# Patient Record
Sex: FEMALE | Race: WHITE | NOT HISPANIC OR LATINO | Employment: OTHER | URBAN - METROPOLITAN AREA
[De-identification: names, ages, dates, MRNs, and addresses within clinical notes are randomized per-mention and may not be internally consistent; named-entity substitution may affect disease eponyms.]

---

## 2023-10-30 PROBLEM — E78.5 HYPERLIPIDEMIA: Status: ACTIVE | Noted: 2023-10-30

## 2023-10-30 PROBLEM — E11.9 DIABETES MELLITUS (MULTI): Status: ACTIVE | Noted: 2023-10-30

## 2023-10-30 PROBLEM — E55.9 VITAMIN D DEFICIENCY: Status: ACTIVE | Noted: 2023-10-30

## 2023-10-30 PROBLEM — R03.0 FINDING OF ABOVE NORMAL BLOOD PRESSURE: Status: ACTIVE | Noted: 2023-10-30

## 2023-10-30 RX ORDER — CITALOPRAM 10 MG/1
1 TABLET ORAL DAILY
COMMUNITY
End: 2023-10-31 | Stop reason: WASHOUT

## 2023-10-30 RX ORDER — ACETAMINOPHEN 325 MG/1
TABLET ORAL
COMMUNITY

## 2023-10-30 RX ORDER — FLUTICASONE PROPIONATE 50 MCG
1 SPRAY, SUSPENSION (ML) NASAL DAILY
COMMUNITY
Start: 2022-11-02 | End: 2023-10-31 | Stop reason: WASHOUT

## 2023-10-30 RX ORDER — IBUPROFEN 200 MG
TABLET ORAL
COMMUNITY
End: 2023-10-31 | Stop reason: WASHOUT

## 2023-10-30 RX ORDER — ERGOCALCIFEROL 1.25 MG/1
1 CAPSULE ORAL
COMMUNITY
Start: 2023-04-21 | End: 2023-10-31 | Stop reason: SDUPTHER

## 2023-10-31 ENCOUNTER — OFFICE VISIT (OUTPATIENT)
Dept: PRIMARY CARE | Facility: CLINIC | Age: 73
End: 2023-10-31
Payer: MEDICARE

## 2023-10-31 VITALS
HEIGHT: 65 IN | WEIGHT: 157.8 LBS | OXYGEN SATURATION: 97 % | RESPIRATION RATE: 18 BRPM | BODY MASS INDEX: 26.29 KG/M2 | DIASTOLIC BLOOD PRESSURE: 78 MMHG | SYSTOLIC BLOOD PRESSURE: 110 MMHG | HEART RATE: 87 BPM | TEMPERATURE: 97.9 F

## 2023-10-31 DIAGNOSIS — R53.83 OTHER FATIGUE: ICD-10-CM

## 2023-10-31 DIAGNOSIS — E11.65 TYPE 2 DIABETES MELLITUS WITH HYPERGLYCEMIA, WITHOUT LONG-TERM CURRENT USE OF INSULIN (MULTI): ICD-10-CM

## 2023-10-31 DIAGNOSIS — F41.9 ANXIETY: ICD-10-CM

## 2023-10-31 DIAGNOSIS — F17.200 TOBACCO DEPENDENCE: Primary | ICD-10-CM

## 2023-10-31 DIAGNOSIS — Z13.220 NEED FOR LIPID SCREENING: ICD-10-CM

## 2023-10-31 DIAGNOSIS — E55.9 VITAMIN D DEFICIENCY: ICD-10-CM

## 2023-10-31 LAB
25(OH)D3 SERPL-MCNC: 32 NG/ML (ref 31–100)
ALBUMIN SERPL-MCNC: 4.4 G/DL (ref 3.5–5)
ALP BLD-CCNC: 95 U/L (ref 35–125)
ALT SERPL-CCNC: 17 U/L (ref 5–40)
ANION GAP SERPL CALC-SCNC: 11 MMOL/L
AST SERPL-CCNC: 21 U/L (ref 5–40)
BILIRUB SERPL-MCNC: <0.2 MG/DL (ref 0.1–1.2)
BUN SERPL-MCNC: 18 MG/DL (ref 8–25)
CALCIUM SERPL-MCNC: 9.5 MG/DL (ref 8.5–10.4)
CHLORIDE SERPL-SCNC: 103 MMOL/L (ref 97–107)
CHOLEST SERPL-MCNC: 188 MG/DL (ref 133–200)
CHOLEST/HDLC SERPL: 4.8 {RATIO}
CO2 SERPL-SCNC: 26 MMOL/L (ref 24–31)
CREAT SERPL-MCNC: 0.8 MG/DL (ref 0.4–1.6)
ERYTHROCYTE [DISTWIDTH] IN BLOOD BY AUTOMATED COUNT: 13.3 % (ref 11.5–14.5)
GFR SERPL CREATININE-BSD FRML MDRD: 77 ML/MIN/1.73M*2
GLUCOSE SERPL-MCNC: 109 MG/DL (ref 65–99)
HCT VFR BLD AUTO: 42.8 % (ref 36–46)
HDLC SERPL-MCNC: 39 MG/DL
HGB BLD-MCNC: 14 G/DL (ref 12–16)
LDLC SERPL CALC-MCNC: 109 MG/DL (ref 65–130)
MCH RBC QN AUTO: 31 PG (ref 26–34)
MCHC RBC AUTO-ENTMCNC: 32.7 G/DL (ref 32–36)
MCV RBC AUTO: 95 FL (ref 80–100)
NRBC BLD-RTO: 0 /100 WBCS (ref 0–0)
PLATELET # BLD AUTO: 349 X10*3/UL (ref 150–450)
PMV BLD AUTO: 10.1 FL (ref 7.5–11.5)
POC HEMOGLOBIN A1C: 6.8 % (ref 4.2–6.5)
POTASSIUM SERPL-SCNC: 4.4 MMOL/L (ref 3.4–5.1)
PROT SERPL-MCNC: 7.4 G/DL (ref 5.9–7.9)
RBC # BLD AUTO: 4.52 X10*6/UL (ref 4–5.2)
SODIUM SERPL-SCNC: 140 MMOL/L (ref 133–145)
TRIGL SERPL-MCNC: 198 MG/DL (ref 40–150)
TSH SERPL DL<=0.05 MIU/L-ACNC: 2.04 MIU/L (ref 0.27–4.2)
WBC # BLD AUTO: 7.9 X10*3/UL (ref 4.4–11.3)

## 2023-10-31 PROCEDURE — 85027 COMPLETE CBC AUTOMATED: CPT | Performed by: NURSE PRACTITIONER

## 2023-10-31 PROCEDURE — 80053 COMPREHEN METABOLIC PANEL: CPT | Performed by: NURSE PRACTITIONER

## 2023-10-31 PROCEDURE — 84443 ASSAY THYROID STIM HORMONE: CPT | Performed by: NURSE PRACTITIONER

## 2023-10-31 PROCEDURE — 1126F AMNT PAIN NOTED NONE PRSNT: CPT | Performed by: NURSE PRACTITIONER

## 2023-10-31 PROCEDURE — 3078F DIAST BP <80 MM HG: CPT | Performed by: NURSE PRACTITIONER

## 2023-10-31 PROCEDURE — 36415 COLL VENOUS BLD VENIPUNCTURE: CPT | Performed by: NURSE PRACTITIONER

## 2023-10-31 PROCEDURE — 3074F SYST BP LT 130 MM HG: CPT | Performed by: NURSE PRACTITIONER

## 2023-10-31 PROCEDURE — 99214 OFFICE O/P EST MOD 30 MIN: CPT | Performed by: NURSE PRACTITIONER

## 2023-10-31 PROCEDURE — 3049F LDL-C 100-129 MG/DL: CPT | Performed by: NURSE PRACTITIONER

## 2023-10-31 PROCEDURE — 1159F MED LIST DOCD IN RCRD: CPT | Performed by: NURSE PRACTITIONER

## 2023-10-31 PROCEDURE — 80061 LIPID PANEL: CPT | Performed by: NURSE PRACTITIONER

## 2023-10-31 PROCEDURE — 82306 VITAMIN D 25 HYDROXY: CPT | Performed by: NURSE PRACTITIONER

## 2023-10-31 RX ORDER — ESCITALOPRAM OXALATE 20 MG/1
20 TABLET ORAL DAILY
COMMUNITY
Start: 2023-10-25 | End: 2023-10-31 | Stop reason: SDUPTHER

## 2023-10-31 RX ORDER — VALACYCLOVIR HYDROCHLORIDE 1 G/1
1000 TABLET, FILM COATED ORAL 3 TIMES DAILY
COMMUNITY
Start: 2023-04-18 | End: 2023-10-31 | Stop reason: WASHOUT

## 2023-10-31 RX ORDER — ERGOCALCIFEROL 1.25 MG/1
1 CAPSULE ORAL
Qty: 12 CAPSULE | Refills: 3 | Status: ON HOLD | OUTPATIENT
Start: 2023-10-31 | End: 2024-04-29 | Stop reason: ALTCHOICE

## 2023-10-31 RX ORDER — ESCITALOPRAM OXALATE 20 MG/1
20 TABLET ORAL DAILY
Qty: 90 TABLET | Refills: 1 | Status: ON HOLD | OUTPATIENT
Start: 2023-10-31 | End: 2024-04-29 | Stop reason: ALTCHOICE

## 2023-10-31 RX ORDER — ERGOCALCIFEROL 1.25 MG/1
50000 CAPSULE ORAL
Qty: 12 CAPSULE | Refills: 3 | Status: CANCELLED | OUTPATIENT
Start: 2023-10-31 | End: 2024-10-01

## 2023-10-31 ASSESSMENT — PATIENT HEALTH QUESTIONNAIRE - PHQ9
9. THOUGHTS THAT YOU WOULD BE BETTER OFF DEAD, OR OF HURTING YOURSELF: NOT AT ALL
1. LITTLE INTEREST OR PLEASURE IN DOING THINGS: MORE THAN HALF THE DAYS
2. FEELING DOWN, DEPRESSED OR HOPELESS: MORE THAN HALF THE DAYS
10. IF YOU CHECKED OFF ANY PROBLEMS, HOW DIFFICULT HAVE THESE PROBLEMS MADE IT FOR YOU TO DO YOUR WORK, TAKE CARE OF THINGS AT HOME, OR GET ALONG WITH OTHER PEOPLE: SOMEWHAT DIFFICULT
SUM OF ALL RESPONSES TO PHQ9 QUESTIONS 1 AND 2: 4
8. MOVING OR SPEAKING SO SLOWLY THAT OTHER PEOPLE COULD HAVE NOTICED. OR THE OPPOSITE, BEING SO FIGETY OR RESTLESS THAT YOU HAVE BEEN MOVING AROUND A LOT MORE THAN USUAL: MORE THAN HALF THE DAYS
7. TROUBLE CONCENTRATING ON THINGS, SUCH AS READING THE NEWSPAPER OR WATCHING TELEVISION: NEARLY EVERY DAY
6. FEELING BAD ABOUT YOURSELF - OR THAT YOU ARE A FAILURE OR HAVE LET YOURSELF OR YOUR FAMILY DOWN: NOT AT ALL
SUM OF ALL RESPONSES TO PHQ QUESTIONS 1-9: 14
5. POOR APPETITE OR OVEREATING: SEVERAL DAYS
3. TROUBLE FALLING OR STAYING ASLEEP OR SLEEPING TOO MUCH: MORE THAN HALF THE DAYS
4. FEELING TIRED OR HAVING LITTLE ENERGY: MORE THAN HALF THE DAYS

## 2023-10-31 ASSESSMENT — ENCOUNTER SYMPTOMS
BLURRED VISION: 1
FATIGUE: 1

## 2023-10-31 ASSESSMENT — LIFESTYLE VARIABLES
HOW MANY STANDARD DRINKS CONTAINING ALCOHOL DO YOU HAVE ON A TYPICAL DAY: PATIENT DOES NOT DRINK
SKIP TO QUESTIONS 9-10: 1
AUDIT-C TOTAL SCORE: 0
HOW OFTEN DO YOU HAVE A DRINK CONTAINING ALCOHOL: NEVER
HOW OFTEN DO YOU HAVE SIX OR MORE DRINKS ON ONE OCCASION: NEVER

## 2023-10-31 ASSESSMENT — PAIN SCALES - GENERAL: PAINLEVEL: 0-NO PAIN

## 2023-10-31 NOTE — PROGRESS NOTES
"Subjective   Patient ID: Veronica Rice is a 73 y.o. female who presents for Diabetes (PT STATES SHE WANTS A1C CHECKED, DOES NOT WANT MEDS FOR DM. ).    Here for dm check, has a bloodstreams with family daughter had a bad stroke caring for her, lots off stress.has had some eye changes , has appointment on Friday.has had cataract surgery . Starting to forget stuff at this time ,refusing to take medication , for dm , has some depression lexapro  hrelpful. Good support system.    Diabetes  She presents for her follow-up diabetic visit. She has type 2 diabetes mellitus. No MedicAlert identification noted. Her disease course has been improving. Associated symptoms include blurred vision and fatigue. There are no hypoglycemic complications. Risk factors for coronary artery disease include post-menopausal, diabetes mellitus and tobacco exposure. She is compliant with treatment most of the time. She is following a generally healthy diet. She has not had a previous visit with a dietitian. She participates in exercise intermittently. An ACE inhibitor/angiotensin II receptor blocker is not being taken. She does not see a podiatrist.Eye exam is current.        Review of Systems   Constitutional:  Positive for fatigue.   Eyes:  Positive for blurred vision.       Objective   /78   Pulse 87   Temp 36.6 °C (97.9 °F)   Resp 18   Ht 1.638 m (5' 4.5\")   Wt 71.6 kg (157 lb 12.8 oz)   SpO2 97%   BMI 26.67 kg/m²     Physical Exam  Constitutional:       Appearance: Normal appearance. She is normal weight.   HENT:      Head: Normocephalic and atraumatic.      Right Ear: Tympanic membrane normal.      Left Ear: Tympanic membrane normal.      Nose: Nose normal.      Mouth/Throat:      Mouth: Mucous membranes are moist.   Eyes:      Pupils: Pupils are equal, round, and reactive to light.   Cardiovascular:      Rate and Rhythm: Normal rate and regular rhythm.      Pulses: Normal pulses.           Dorsalis pedis pulses are 2+ on the " right side and 2+ on the left side.        Posterior tibial pulses are 2+ on the right side and 2+ on the left side.      Heart sounds: Normal heart sounds.   Pulmonary:      Effort: Pulmonary effort is normal.   Abdominal:      General: Abdomen is flat. Bowel sounds are normal.   Musculoskeletal:      Cervical back: Normal range of motion.   Feet:      Right foot:      Skin integrity: Skin integrity normal.   Neurological:      Mental Status: She is alert and oriented to person, place, and time. Mental status is at baseline.   Psychiatric:         Mood and Affect: Mood normal.      Comments: Discussed family stresses has support system discussed memory issues feeling overwhelmed at [facility]         Assessment/Plan   Problem List Items Addressed This Visit             ICD-10-CM    Diabetes mellitus (CMS/HCC) E11.9    Relevant Medications    SITagliptin phosphate (Januvia) 50 mg tablet    Other Relevant Orders    POCT glycosylated hemoglobin (Hb A1C) manually resulted (Completed)    Comprehensive Metabolic Panel (Completed)    Vitamin D deficiency E55.9    Relevant Medications    ergocalciferol (Vitamin D-2) 1.25 MG (13680 UT) capsule    Other Relevant Orders    Vitamin D 25-Hydroxy,Total (for eval of Vitamin D levels) (Completed)     Other Visit Diagnoses         Codes    Tobacco dependence    -  Primary F17.200    Anxiety     F41.9    Relevant Medications    escitalopram (Lexapro) 20 mg tablet    Other fatigue     R53.83    Relevant Orders    CBC (Completed)    TSH with reflex to Free T4 if abnormal (Completed)    Need for lipid screening     Z13.220    Relevant Orders    Lipid Panel (Completed)

## 2023-10-31 NOTE — PATIENT INSTRUCTIONS
Discussed smoking cessation  pt not interested in stopping.  attempted to order ct insurance will not pay discussed starting medication. Cannot take metformin, willing to try medication,

## 2023-11-13 ENCOUNTER — APPOINTMENT (OUTPATIENT)
Dept: PRIMARY CARE | Facility: CLINIC | Age: 73
End: 2023-11-13
Payer: MEDICARE

## 2024-03-05 RX ORDER — CITALOPRAM 20 MG/1
20 TABLET, FILM COATED ORAL DAILY
Qty: 30 TABLET | Refills: 1 | Status: ON HOLD | OUTPATIENT
Start: 2024-03-05 | End: 2024-04-29 | Stop reason: ALTCHOICE

## 2024-04-17 ENCOUNTER — OFFICE VISIT (OUTPATIENT)
Dept: PRIMARY CARE | Facility: CLINIC | Age: 74
End: 2024-04-17
Payer: MEDICARE

## 2024-04-17 VITALS
DIASTOLIC BLOOD PRESSURE: 84 MMHG | WEIGHT: 162 LBS | HEIGHT: 65 IN | SYSTOLIC BLOOD PRESSURE: 107 MMHG | OXYGEN SATURATION: 98 % | RESPIRATION RATE: 16 BRPM | BODY MASS INDEX: 26.99 KG/M2 | HEART RATE: 54 BPM | TEMPERATURE: 97.5 F

## 2024-04-17 DIAGNOSIS — F32.A DEPRESSION, UNSPECIFIED DEPRESSION TYPE: ICD-10-CM

## 2024-04-17 DIAGNOSIS — R07.89 ATYPICAL CHEST PAIN: ICD-10-CM

## 2024-04-17 DIAGNOSIS — E11.65 TYPE 2 DIABETES MELLITUS WITH HYPERGLYCEMIA, WITHOUT LONG-TERM CURRENT USE OF INSULIN (MULTI): ICD-10-CM

## 2024-04-17 DIAGNOSIS — M25.561 CHRONIC PAIN OF BOTH KNEES: ICD-10-CM

## 2024-04-17 DIAGNOSIS — Z12.11 COLON CANCER SCREENING: ICD-10-CM

## 2024-04-17 DIAGNOSIS — F43.0 ACUTE REACTION TO STRESS: ICD-10-CM

## 2024-04-17 DIAGNOSIS — F17.200 TOBACCO DEPENDENCE: ICD-10-CM

## 2024-04-17 DIAGNOSIS — R42 LIGHTHEADEDNESS: ICD-10-CM

## 2024-04-17 DIAGNOSIS — Z12.39 BREAST SCREENING: ICD-10-CM

## 2024-04-17 DIAGNOSIS — G89.29 CHRONIC PAIN OF BOTH KNEES: ICD-10-CM

## 2024-04-17 DIAGNOSIS — F41.9 ANXIETY: Primary | ICD-10-CM

## 2024-04-17 DIAGNOSIS — H53.9 VISION CHANGES: ICD-10-CM

## 2024-04-17 DIAGNOSIS — M81.0 AGE RELATED OSTEOPOROSIS, UNSPECIFIED PATHOLOGICAL FRACTURE PRESENCE: ICD-10-CM

## 2024-04-17 DIAGNOSIS — R41.3 MEMORY CHANGES: ICD-10-CM

## 2024-04-17 DIAGNOSIS — R91.8 LUNG NODULES: ICD-10-CM

## 2024-04-17 DIAGNOSIS — M25.562 CHRONIC PAIN OF BOTH KNEES: ICD-10-CM

## 2024-04-17 DIAGNOSIS — E55.9 VITAMIN D DEFICIENCY: ICD-10-CM

## 2024-04-17 DIAGNOSIS — M81.0 OSTEOPOROSIS, UNSPECIFIED OSTEOPOROSIS TYPE, UNSPECIFIED PATHOLOGICAL FRACTURE PRESENCE: ICD-10-CM

## 2024-04-17 DIAGNOSIS — E78.5 HYPERLIPIDEMIA, UNSPECIFIED HYPERLIPIDEMIA TYPE: ICD-10-CM

## 2024-04-17 DIAGNOSIS — Z12.31 ENCOUNTER FOR SCREENING MAMMOGRAM FOR MALIGNANT NEOPLASM OF BREAST: ICD-10-CM

## 2024-04-17 LAB — POC HEMOGLOBIN A1C: 7.7 % (ref 4.2–6.5)

## 2024-04-17 PROCEDURE — 80061 LIPID PANEL: CPT | Performed by: NURSE PRACTITIONER

## 2024-04-17 PROCEDURE — 1159F MED LIST DOCD IN RCRD: CPT | Performed by: NURSE PRACTITIONER

## 2024-04-17 PROCEDURE — 93005 ELECTROCARDIOGRAM TRACING: CPT | Performed by: NURSE PRACTITIONER

## 2024-04-17 PROCEDURE — 1125F AMNT PAIN NOTED PAIN PRSNT: CPT | Performed by: NURSE PRACTITIONER

## 2024-04-17 PROCEDURE — 82306 VITAMIN D 25 HYDROXY: CPT | Performed by: NURSE PRACTITIONER

## 2024-04-17 PROCEDURE — 80053 COMPREHEN METABOLIC PANEL: CPT | Performed by: NURSE PRACTITIONER

## 2024-04-17 PROCEDURE — 93010 ELECTROCARDIOGRAM REPORT: CPT | Performed by: NURSE PRACTITIONER

## 2024-04-17 PROCEDURE — 3074F SYST BP LT 130 MM HG: CPT | Performed by: NURSE PRACTITIONER

## 2024-04-17 PROCEDURE — 85025 COMPLETE CBC W/AUTO DIFF WBC: CPT | Performed by: NURSE PRACTITIONER

## 2024-04-17 PROCEDURE — 82043 UR ALBUMIN QUANTITATIVE: CPT | Performed by: NURSE PRACTITIONER

## 2024-04-17 PROCEDURE — 99214 OFFICE O/P EST MOD 30 MIN: CPT | Performed by: NURSE PRACTITIONER

## 2024-04-17 PROCEDURE — 36415 COLL VENOUS BLD VENIPUNCTURE: CPT | Performed by: NURSE PRACTITIONER

## 2024-04-17 PROCEDURE — 83036 HEMOGLOBIN GLYCOSYLATED A1C: CPT | Mod: MUE | Performed by: NURSE PRACTITIONER

## 2024-04-17 PROCEDURE — 4010F ACE/ARB THERAPY RXD/TAKEN: CPT | Performed by: NURSE PRACTITIONER

## 2024-04-17 PROCEDURE — 84443 ASSAY THYROID STIM HORMONE: CPT | Performed by: NURSE PRACTITIONER

## 2024-04-17 PROCEDURE — 3079F DIAST BP 80-89 MM HG: CPT | Performed by: NURSE PRACTITIONER

## 2024-04-17 RX ORDER — ESCITALOPRAM OXALATE 20 MG/1
20 TABLET ORAL DAILY
Qty: 30 TABLET | Refills: 5 | Status: SHIPPED | OUTPATIENT
Start: 2024-04-17 | End: 2024-10-14

## 2024-04-17 RX ORDER — LISINOPRIL 5 MG/1
5 TABLET ORAL DAILY
Qty: 100 TABLET | Refills: 3 | Status: SHIPPED | OUTPATIENT
Start: 2024-04-17 | End: 2025-05-22

## 2024-04-17 ASSESSMENT — LIFESTYLE VARIABLES
SKIP TO QUESTIONS 9-10: 1
HOW MANY STANDARD DRINKS CONTAINING ALCOHOL DO YOU HAVE ON A TYPICAL DAY: PATIENT DOES NOT DRINK
HOW OFTEN DO YOU HAVE SIX OR MORE DRINKS ON ONE OCCASION: NEVER
AUDIT-C TOTAL SCORE: 0
HOW OFTEN DO YOU HAVE A DRINK CONTAINING ALCOHOL: NEVER

## 2024-04-17 ASSESSMENT — ENCOUNTER SYMPTOMS
VISUAL CHANGE: 1
BLURRED VISION: 1
NERVOUS/ANXIOUS: 1

## 2024-04-17 ASSESSMENT — PAIN SCALES - GENERAL: PAINLEVEL: 5

## 2024-04-17 ASSESSMENT — PATIENT HEALTH QUESTIONNAIRE - PHQ9
1. LITTLE INTEREST OR PLEASURE IN DOING THINGS: SEVERAL DAYS
2. FEELING DOWN, DEPRESSED OR HOPELESS: SEVERAL DAYS
SUM OF ALL RESPONSES TO PHQ9 QUESTIONS 1 AND 2: 2

## 2024-04-18 LAB
25(OH)D3 SERPL-MCNC: 21 NG/ML (ref 31–100)
ALBUMIN SERPL-MCNC: 4.2 G/DL (ref 3.5–5)
ALP BLD-CCNC: 101 U/L (ref 35–125)
ALT SERPL-CCNC: 21 U/L (ref 5–40)
ANION GAP SERPL CALC-SCNC: 12 MMOL/L
AST SERPL-CCNC: 22 U/L (ref 5–40)
BASOPHILS # BLD AUTO: 0.05 X10*3/UL (ref 0–0.1)
BASOPHILS NFR BLD AUTO: 0.7 %
BILIRUB SERPL-MCNC: 0.2 MG/DL (ref 0.1–1.2)
BUN SERPL-MCNC: 14 MG/DL (ref 8–25)
CALCIUM SERPL-MCNC: 9.2 MG/DL (ref 8.5–10.4)
CHLORIDE SERPL-SCNC: 105 MMOL/L (ref 97–107)
CHOLEST SERPL-MCNC: 193 MG/DL (ref 133–200)
CHOLEST/HDLC SERPL: 5.2 {RATIO}
CO2 SERPL-SCNC: 24 MMOL/L (ref 24–31)
CREAT SERPL-MCNC: 0.8 MG/DL (ref 0.4–1.6)
CREAT UR-MCNC: 149.8 MG/DL
EGFRCR SERPLBLD CKD-EPI 2021: 77 ML/MIN/1.73M*2
EOSINOPHIL # BLD AUTO: 0.22 X10*3/UL (ref 0–0.4)
EOSINOPHIL NFR BLD AUTO: 2.9 %
ERYTHROCYTE [DISTWIDTH] IN BLOOD BY AUTOMATED COUNT: 13.6 % (ref 11.5–14.5)
GLUCOSE SERPL-MCNC: 93 MG/DL (ref 65–99)
HCT VFR BLD AUTO: 42.6 % (ref 36–46)
HDLC SERPL-MCNC: 37 MG/DL
HGB BLD-MCNC: 13.9 G/DL (ref 12–16)
IMM GRANULOCYTES # BLD AUTO: 0.02 X10*3/UL (ref 0–0.5)
IMM GRANULOCYTES NFR BLD AUTO: 0.3 % (ref 0–0.9)
LDLC SERPL CALC-MCNC: 111 MG/DL (ref 65–130)
LYMPHOCYTES # BLD AUTO: 2.81 X10*3/UL (ref 0.8–3)
LYMPHOCYTES NFR BLD AUTO: 37 %
MCH RBC QN AUTO: 30.5 PG (ref 26–34)
MCHC RBC AUTO-ENTMCNC: 32.6 G/DL (ref 32–36)
MCV RBC AUTO: 94 FL (ref 80–100)
MICROALBUMIN UR-MCNC: 19 MG/L (ref 0–23)
MICROALBUMIN/CREAT UR: 12.7 UG/MG CREAT
MONOCYTES # BLD AUTO: 0.47 X10*3/UL (ref 0.05–0.8)
MONOCYTES NFR BLD AUTO: 6.2 %
NEUTROPHILS # BLD AUTO: 4.02 X10*3/UL (ref 1.6–5.5)
NEUTROPHILS NFR BLD AUTO: 52.9 %
NRBC BLD-RTO: 0 /100 WBCS (ref 0–0)
PLATELET # BLD AUTO: 282 X10*3/UL (ref 150–450)
POTASSIUM SERPL-SCNC: 4.5 MMOL/L (ref 3.4–5.1)
PROT SERPL-MCNC: 7.3 G/DL (ref 5.9–7.9)
RBC # BLD AUTO: 4.55 X10*6/UL (ref 4–5.2)
SODIUM SERPL-SCNC: 141 MMOL/L (ref 133–145)
TRIGL SERPL-MCNC: 227 MG/DL (ref 40–150)
TSH SERPL DL<=0.05 MIU/L-ACNC: 2.17 MIU/L (ref 0.27–4.2)
WBC # BLD AUTO: 7.6 X10*3/UL (ref 4.4–11.3)

## 2024-04-18 NOTE — PROGRESS NOTES
"Subjective   Patient ID: Veronica Rice is a 73 y.o. female who presents for Diabetes (PT HERE FOR DIABETIC CHECK. PT WILL SEE EYE DR ON FRIDAY FOR BLEED IN EYE. PT C/O EXHAUSTION X COUPLE YEARS-GETTING WORSE. PT ALSO C/O ANXIETY/7.7).    Pt here with her . Has multiple issues to discuss, anxiety fatigue atypical chest pain retinal bleed depression and memory issues    Diabetes  She presents for her follow-up diabetic visit. She has type 2 diabetes mellitus. No MedicAlert identification noted. The initial diagnosis of diabetes was made 9 years ago. Her disease course has been worsening. Hypoglycemia symptoms include mood changes and nervousness/anxiousness. Associated symptoms include blurred vision and visual change. There are no hypoglycemic complications. Symptoms are worsening. Diabetic complications include retinopathy. Risk factors for coronary artery disease include diabetes mellitus, dyslipidemia, hypertension, stress and tobacco exposure. Current diabetic treatment includes oral agent (dual therapy). She is compliant with treatment some of the time.        Review of Systems   Eyes:  Positive for blurred vision.   Psychiatric/Behavioral:  The patient is nervous/anxious.        Objective   /84   Pulse 54   Temp 36.4 °C (97.5 °F)   Resp 16   Ht 1.638 m (5' 4.5\")   Wt 73.5 kg (162 lb)   SpO2 98%   BMI 27.38 kg/m²     Physical Exam  Constitutional:       Appearance: Normal appearance.   HENT:      Right Ear: Tympanic membrane normal.      Left Ear: Tympanic membrane normal.      Nose: Nose normal.      Mouth/Throat:      Mouth: Mucous membranes are moist.   Cardiovascular:      Rate and Rhythm: Normal rate and regular rhythm.      Pulses: Normal pulses.      Heart sounds: Normal heart sounds.   Pulmonary:      Effort: Pulmonary effort is normal.   Abdominal:      General: Bowel sounds are normal.      Palpations: Abdomen is soft.   Musculoskeletal:         General: Normal range of motion. "   Neurological:      Mental Status: She is alert and oriented to person, place, and time.   Psychiatric:      Comments: Tearful crying overwhelmed. Her daughter has multiple health issues .overwhelmed, forgetful.  agrees,          Assessment/Plan   Problem List Items Addressed This Visit             ICD-10-CM    Diabetes mellitus (Multi) E11.9    Relevant Medications    SITagliptin phosphate (Januvia) 100 mg tablet    lisinopril 5 mg tablet    Other Relevant Orders    POCT glycosylated hemoglobin (Hb A1C) manually resulted (Completed)    Comprehensive Metabolic Panel    Albumin, urine, random    Hyperlipidemia E78.5    Relevant Orders    CBC and Auto Differential    Comprehensive Metabolic Panel    TSH with reflex to Free T4 if abnormal    Lipid Panel    Vitamin D deficiency E55.9    Relevant Orders    Vitamin D 25-Hydroxy,Total (for eval of Vitamin D levels)     Other Visit Diagnoses         Codes    Anxiety    -  Primary F41.9    Relevant Orders    TSH with reflex to Free T4 if abnormal    Depression, unspecified depression type     F32.A    Relevant Medications    escitalopram (Lexapro) 20 mg tablet    Other Relevant Orders    TSH with reflex to Free T4 if abnormal    CT cardiac scoring wo IV contrast    Breast screening     Z12.39    Relevant Orders    BI mammo bilateral screening tomosynthesis    Age related osteoporosis, unspecified pathological fracture presence     M81.0    Acute reaction to stress     F43.0    Memory changes     R41.3    Vision changes     H53.9    Relevant Orders    Referral to Neurology    Encounter for screening mammogram for malignant neoplasm of breast     Z12.31    Relevant Orders    BI mammo bilateral screening tomosynthesis    Osteoporosis, unspecified osteoporosis type, unspecified pathological fracture presence     M81.0    Relevant Orders    XR DEXA bone density    Atypical chest pain     R07.89    Relevant Orders    ECG 12 lead (Clinic Performed) (Completed)    Referral  to Cardiology    Lightheadedness     R42    Relevant Orders    US carotid doppler left    US carotid doppler right    Referral to Cardiology    Tobacco dependence     F17.200    Relevant Orders    Vascular US abdominal aorta anuerysm AAA screening    Colon cancer screening     Z12.11    Relevant Orders    Referral to Gastroenterology    Chronic pain of both knees     M25.561, M25.562, G89.29    Relevant Orders    Disability Placard    Lung nodules     R91.8    Relevant Orders    CT chest wo IV contrast

## 2024-04-18 NOTE — PATIENT INSTRUCTIONS
Long discussion with patient and her , needs to follow through with screening and preventive ct and scans, needs to take medication as prescribed. Discussed stresses , seeing Opth for a retinal bleed, discussed compliance with dm med's, not ready to quit smoking,return in 6 weeks for anxiety and depression check . Referral given tests ordered return for dm visit in 3 months need cpe

## 2024-04-19 ENCOUNTER — HOSPITAL ENCOUNTER (EMERGENCY)
Facility: HOSPITAL | Age: 74
Discharge: LEFT BEFORE DISCHARGE | End: 2024-04-19
Attending: EMERGENCY MEDICINE
Payer: MEDICARE

## 2024-04-19 VITALS
RESPIRATION RATE: 16 BRPM | SYSTOLIC BLOOD PRESSURE: 115 MMHG | HEART RATE: 78 BPM | WEIGHT: 162 LBS | DIASTOLIC BLOOD PRESSURE: 90 MMHG | BODY MASS INDEX: 27.66 KG/M2 | OXYGEN SATURATION: 96 % | HEIGHT: 64 IN | TEMPERATURE: 97.5 F

## 2024-04-19 DIAGNOSIS — M31.6 GCA (GIANT CELL ARTERITIS) (MULTI): Primary | ICD-10-CM

## 2024-04-19 DIAGNOSIS — H53.9 VISION CHANGES: Primary | ICD-10-CM

## 2024-04-19 LAB
ALBUMIN SERPL BCP-MCNC: 4.3 G/DL (ref 3.4–5)
ALP SERPL-CCNC: 92 U/L (ref 33–136)
ALT SERPL W P-5'-P-CCNC: 23 U/L (ref 7–45)
ANION GAP SERPL CALC-SCNC: 14 MMOL/L (ref 10–20)
AST SERPL W P-5'-P-CCNC: 27 U/L (ref 9–39)
BASOPHILS # BLD AUTO: 0.08 X10*3/UL (ref 0–0.1)
BASOPHILS NFR BLD AUTO: 1.1 %
BILIRUB SERPL-MCNC: 0.4 MG/DL (ref 0–1.2)
BUN SERPL-MCNC: 11 MG/DL (ref 6–23)
CALCIUM SERPL-MCNC: 9.1 MG/DL (ref 8.6–10.6)
CHLORIDE SERPL-SCNC: 102 MMOL/L (ref 98–107)
CO2 SERPL-SCNC: 26 MMOL/L (ref 21–32)
CREAT SERPL-MCNC: 0.79 MG/DL (ref 0.5–1.05)
CRP SERPL-MCNC: 0.29 MG/DL
EGFRCR SERPLBLD CKD-EPI 2021: 78 ML/MIN/1.73M*2
EOSINOPHIL # BLD AUTO: 0.27 X10*3/UL (ref 0–0.4)
EOSINOPHIL NFR BLD AUTO: 3.8 %
ERYTHROCYTE [DISTWIDTH] IN BLOOD BY AUTOMATED COUNT: 13.5 % (ref 11.5–14.5)
ERYTHROCYTE [SEDIMENTATION RATE] IN BLOOD BY WESTERGREN METHOD: 12 MM/H (ref 0–30)
GLUCOSE SERPL-MCNC: 207 MG/DL (ref 74–99)
HCT VFR BLD AUTO: 40.6 % (ref 36–46)
HGB BLD-MCNC: 14.4 G/DL (ref 12–16)
IMM GRANULOCYTES # BLD AUTO: 0.02 X10*3/UL (ref 0–0.5)
IMM GRANULOCYTES NFR BLD AUTO: 0.3 % (ref 0–0.9)
LYMPHOCYTES # BLD AUTO: 2.95 X10*3/UL (ref 0.8–3)
LYMPHOCYTES NFR BLD AUTO: 41 %
MCH RBC QN AUTO: 31.6 PG (ref 26–34)
MCHC RBC AUTO-ENTMCNC: 35.5 G/DL (ref 32–36)
MCV RBC AUTO: 89 FL (ref 80–100)
MONOCYTES # BLD AUTO: 0.45 X10*3/UL (ref 0.05–0.8)
MONOCYTES NFR BLD AUTO: 6.3 %
NEUTROPHILS # BLD AUTO: 3.43 X10*3/UL (ref 1.6–5.5)
NEUTROPHILS NFR BLD AUTO: 47.5 %
NRBC BLD-RTO: 0 /100 WBCS (ref 0–0)
PLATELET # BLD AUTO: 273 X10*3/UL (ref 150–450)
POTASSIUM SERPL-SCNC: 4.6 MMOL/L (ref 3.5–5.3)
PROT SERPL-MCNC: 7.4 G/DL (ref 6.4–8.2)
RBC # BLD AUTO: 4.56 X10*6/UL (ref 4–5.2)
SODIUM SERPL-SCNC: 137 MMOL/L (ref 136–145)
WBC # BLD AUTO: 7.2 X10*3/UL (ref 4.4–11.3)

## 2024-04-19 PROCEDURE — 36415 COLL VENOUS BLD VENIPUNCTURE: CPT | Performed by: PHYSICIAN ASSISTANT

## 2024-04-19 PROCEDURE — 85652 RBC SED RATE AUTOMATED: CPT | Performed by: PHYSICIAN ASSISTANT

## 2024-04-19 PROCEDURE — 99284 EMERGENCY DEPT VISIT MOD MDM: CPT

## 2024-04-19 PROCEDURE — 99204 OFFICE O/P NEW MOD 45 MIN: CPT | Performed by: PSYCHIATRY & NEUROLOGY

## 2024-04-19 PROCEDURE — 80053 COMPREHEN METABOLIC PANEL: CPT | Performed by: PHYSICIAN ASSISTANT

## 2024-04-19 PROCEDURE — 85025 COMPLETE CBC W/AUTO DIFF WBC: CPT | Performed by: PHYSICIAN ASSISTANT

## 2024-04-19 PROCEDURE — 99285 EMERGENCY DEPT VISIT HI MDM: CPT | Performed by: PHYSICIAN ASSISTANT

## 2024-04-19 PROCEDURE — 86140 C-REACTIVE PROTEIN: CPT | Performed by: PHYSICIAN ASSISTANT

## 2024-04-19 RX ORDER — PREDNISONE 10 MG/1
70 TABLET ORAL DAILY
Qty: 420 TABLET | Refills: 0 | Status: SHIPPED | OUTPATIENT
Start: 2024-04-19 | End: 2024-05-01 | Stop reason: ALTCHOICE

## 2024-04-19 ASSESSMENT — PAIN SCALES - GENERAL: PAINLEVEL_OUTOF10: 6

## 2024-04-19 ASSESSMENT — COLUMBIA-SUICIDE SEVERITY RATING SCALE - C-SSRS
6. HAVE YOU EVER DONE ANYTHING, STARTED TO DO ANYTHING, OR PREPARED TO DO ANYTHING TO END YOUR LIFE?: NO
2. HAVE YOU ACTUALLY HAD ANY THOUGHTS OF KILLING YOURSELF?: NO
1. IN THE PAST MONTH, HAVE YOU WISHED YOU WERE DEAD OR WISHED YOU COULD GO TO SLEEP AND NOT WAKE UP?: NO

## 2024-04-19 ASSESSMENT — PAIN DESCRIPTION - PROGRESSION: CLINICAL_PROGRESSION: NOT CHANGED

## 2024-04-19 ASSESSMENT — LIFESTYLE VARIABLES
HAVE YOU EVER FELT YOU SHOULD CUT DOWN ON YOUR DRINKING: NO
EVER FELT BAD OR GUILTY ABOUT YOUR DRINKING: NO
TOTAL SCORE: 0
EVER HAD A DRINK FIRST THING IN THE MORNING TO STEADY YOUR NERVES TO GET RID OF A HANGOVER: NO
HAVE PEOPLE ANNOYED YOU BY CRITICIZING YOUR DRINKING: NO

## 2024-04-19 ASSESSMENT — PAIN - FUNCTIONAL ASSESSMENT: PAIN_FUNCTIONAL_ASSESSMENT: 0-10

## 2024-04-19 NOTE — ED PROVIDER NOTES
"73 year old female with recently diagnosed hypertension and diabetes who was sent to the ED by her ophthalmologist for concern of GCA and increased intraocular pressure in office. For the past 3 months in her right eye she has been having blurry vision, episodes of severe headaches with the onset of \"cracked glass\" like appearance lingering over her central vision, neck pain, and scalp tenderness. She denies change in speech and denies any personal or family history of stroke. She endorses numbness and tingling of her hands but it is nothing out of the normal for her. Patient has surgical history of bilateral cataract surgery. She only wears reading glasses, no contact use. Has been smoking 1/2 pack a day for majority of her life. Denies alcohol or drug use.  Visual acuity 20/30 b/l per the note from her ophthalmologist.      History provided by:  Patient and medical records   used: No             Visit Vitals  /90 (BP Location: Right arm, Patient Position: Sitting)   Pulse 78   Temp 36.4 °C (97.5 °F) (Skin)   Resp 16   Ht 1.626 m (5' 4\")   Wt 73.5 kg (162 lb)   SpO2 96%   BMI 27.81 kg/m²   Smoking Status Every Day   BSA 1.82 m²          Physical Exam     Physical exam:   General: Vitals noted, no distress. Afebrile.   EENT:  Hearing grossly intact. Normal phonation. MMM. Airway patient.  Eyes mildly dilated bilaterally (had dilated for her ophthalmology appointment this morning), however were reactive to light EOMI. exquisite tenderness palpation over the right temple.  Neck: No midline tenderness or paraspinal tenderness. FROM.   Cardiac: Regular, rate, rhythm. Normal S1 and S2.  No murmurs, gallops, rubs.   Pulmonary: Good air exchange. Lungs clear bilaterally. No wheezes, rhonchi, rales. No accessory muscle use.   Extremities: No peripheral edema.  Full range of motion. Moves all extremities freely. No tenderness throughout extremities.   Skin: No rash. Warm and Dry.   Neuro: No focal " neurologic deficits. CN 2-12 grossly intact. Sensation equal bilaterally. No weakness.         Labs Reviewed   CBC WITH AUTO DIFFERENTIAL   COMPREHENSIVE METABOLIC PANEL   C-REACTIVE PROTEIN   SEDIMENTATION RATE, AUTOMATED       No orders to display         ED Course & MDM     Medical Decision Making  This is a 73-year-old female with past medical history of hypertension and diabetes who presents to the ED with visual changes to her right eye as well as headaches on the right side for the past several months that are episodic in nature.  She was sent in by outpatient ophthalmology for concern for temporal arteritis.  On examination patient's pupils were dilated, however she had a dilated eye examination this morning.  She did have tenderness palpation over the right temple.  Patient neurologically intact without deficits.  Ophthalmology, neurology, and vascular surgery all consulted for this patient.  She was provided with information for outpatient vascular surgery follow-up for temporal artery biopsy.  Laboratory studies obtained today which showed normal ESR and CRP.  CMP grossly unremarkable other than patient's glucose being elevated at 207.  CBC within normal limits.  Patient's consulting services recommended MRI brain and orbits as well as MRA which were ordered.  Patient while waiting for these MRIs patient elected to leave AGAINST MEDICAL ADVICE because she did not want to wait in the ED any longer.  I discussed the risk of leaving with this patient, however she still wanted to leave and follow-up with her primary care provider as an outpatient.  She was provided with information for vascular surgery, ophthalmology, and neurology follow-up.  She had been sent a prescription for the high-dose prednisone to her pharmacy.  She was advised to take this medication daily due to concern for temporal arteritis.  She was given strict return precautions and was discharged from the ED AGAINST MEDICAL ADVICE.    Amount  and/or Complexity of Data Reviewed  Labs: ordered.    Risk  Prescription drug management.         Diagnoses as of 04/19/24 1852   Vision changes       Procedures    TONE Johnson, PAIsaiahC     Skylar Ramon PA-C  04/19/24 [id number]

## 2024-04-19 NOTE — CONSULTS
"Inpatient consult to Neurology  Consult performed by: Anitra Chan MD  Consult ordered by: Skylar Ramon PA-C      History Of Present Illness  Veronica Rice is a 73 y.o. female w/ PMHx T2DM and anxiety presenting with worsening R temporal headaches and blurry vision for the past 3 months. She was seen by optometry in clinic today and recommended to present to the ED.     Neurology is consulted for concern for temporal arteritis.     She has been having right sided headaches for the past 3 months-  they have been episodic, lasting between 1-4 hours at a time. She has been having them every few days and it has been worsening in frequency. They are sharp and sometimes dull in character, with accompanying photo and phono-phobia. She describes that loud noises and bright lights make them worse and taking tylenol or lying in the dark makes it better. She was recently prescribed excedrin last week by her PCP which she says has been helping.     In terms of her vision - she has been having R eye blurry vision intermittently through the past 3 months. It includes the entirety of her R eye - sometimes feels like she is seeing through \"cracked glass\" other times has had episodes of \"black curtain coming over her right eye and that it is closing in\". These are painless, not always accompanied with her headache and have been stand alone and have never involved her left eye.     She also has accompanying symptoms of fatigue, muscle soreness around her R shoulder and neck along with an occasional shooting sensation down the side of her neck with these headaches. She denies jaw claudication symptoms. She denies lightheadedness. She does not have a personal history of stroke or arrhythmias and was only recent diagnosed with diabetes and was prescribed Januvia and lisinopril for cardiac protection in the past week.     Past Medical History  T2DM and anxiety     Surgical History  R knee replacement     Family history:   Reports " "family history of strokes in family. Daughter had a stroke aged 40s and younger brother had a stroke aged 30s. Has another brother with \"blood clot disorder\".     Social History:   Social History     Tobacco Use    Smoking status: Every Day     Current packs/day: 0.50     Types: Cigarettes    Smokeless tobacco: Never   Substance Use Topics    Alcohol use: Never    Drug use: Never     Home medications:   Januvia 100 mg daily   Lexapro 20 mg daily   Lisinopril 5 mg daily     Allergies  Meperidine  (Not in a hospital admission)    General: Pleasant elderly woman sitting up in chair in no distress.     Neurological Exam:     MENTAL STATE:   Orientation was normal to time, place and person. Recent and remote memory was intact.  Attention span and concentration were normal. Language testing was normal for comprehension, repetition, expression, and naming.      OPHTHALMOSCOPIC: no papilledema visualized     CRANIAL NERVES:   CN 2   Visual fields full to confrontation.   CN 3, 4, 6   Pupils round, 6 mm in diameter (pupils dilated bilaterally for ophthalmologic exam prior to evaluation), equally reactive to light. Lids symmetric; no ptosis. EOMs normal alignment, full range with normal saccades, pursuit and convergence. No nystagmus.   CN 5   Facial sensation intact bilaterally.   CN 7   Normal and symmetric facial strength. Nasolabial folds symmetric.   CN 8   Hearing intact to conversation.  CN 9/10  Palate elevates symmetrically.   CN 11   Normal strength of shoulder shrug and neck turning.   CN 12   Tongue midline, with normal bulk and strength; no fasciculations.     MOTOR:   Muscle bulk and tone were normal in both upper and lower extremities.   No fasciculations, tremor or other abnormal movements were present.                         R          L  Deltoids        5          5  Biceps          5          5  Triceps          5          5  Wrist Flex      5          5  Wrist Ext       5          5    Hip Flex         5  " "        5  Knee Flex      5          5  Knee Ext        5          5  Dorsiflex         5          5  Plantarflex      5          5    REFLEXES:                       R          L  BR:               2         2  Biceps:         2          2  Triceps:        2          2  Knee:            0*          2  *(R knee replacement)  Ankle:          2          2    SENSORY:   In both upper and lower extremities, sensation was intact to light touch. R temple is tender to palpation and temporal artery can be palpated compared to the left.     COORDINATION:    In both upper extremities, finger-nose-finger was intact without dysmetria or overshoot.     GAIT:   Station was stable - somewhat leaning to the right, limited by knee replacement. Gait was stable with a normal arm swing and speed. No ataxia, shuffling, steppage or waddling was present. No circumduction was present. No Romberg sign was present.     Last Recorded Vitals  Blood pressure 115/90, pulse 78, temperature 36.4 °C (97.5 °F), temperature source Skin, resp. rate 16, height 1.626 m (5' 4\"), weight 73.5 kg (162 lb), SpO2 96%.    Relevant Results  Results from last 72 hours   Lab Units 04/19/24  1341 04/17/24  1531   WBC AUTO x10*3/uL 7.2 7.6   HEMOGLOBIN g/dL 14.4 13.9   HEMATOCRIT % 40.6 42.6   PLATELETS AUTO x10*3/uL 273 282        Results from last 72 hours   Lab Units 04/19/24  1341 04/17/24  1531   SODIUM mmol/L 137 141   POTASSIUM mmol/L 4.6 4.5   CHLORIDE mmol/L 102 105   CO2 mmol/L 26 24   BUN mg/dL 11 14   CREATININE mg/dL 0.79 0.80         Results from last 72 hours   Lab Units 04/19/24  1341 04/17/24  1531   AST U/L 27 22   ALT U/L 23 21       HbA1c 7.7% 4/17   Lipid panel 4/17 remarkable for elevated TG to 227, LDL wnl at 111     No recent brain or vessel imaging available for review                               Glade Coma Scale  Best Eye Response: Spontaneous  Best Verbal Response: Oriented  Best Motor Response: Follows commands  Carl Coma Scale " Score: 15                      Assessment/Plan   Active Problems:  There are no active Hospital Problems.  Ms. Rice is a 72 y/o F w PMHx T2DM and anxiety who presents with worsening R temporal headaches and R blurry vision. Neurology consulted for concerns for temporal arteritis. On history and exam, her symptoms are concerning for temporal arteritis given new onset temporal headache past age of 48, accompanying vision changes and accompanying systemic symptoms of R myalgias and fatigue. On exam she also has a mildly tender R temple with palpable R temporal artery compared to the left. Other differentials include fibromuscular dysplasia but this is less likely given her demographics as she is > 69 y/o. She does have reported symptoms of amaurosis fugax but it is unclear what can be causing this given the frequency of it being at least once every week however we will obtain imaging to further characterize her findings given she has risk factors for vascular disease including smoking history, history of DM and family history of strokes. Lastly - retinal migraine is a possibility but is a diagnosis of exclusion and less likely given her systemic symptoms and exam findings.     Recommendations:   - Recommend obtaining ESR and CRP   - Agree with high dose oral steroids for suspected temporal arteritis and agree with vascular surgery consult for temporal artery biopsy   - Recommend obtaining MRI brain and orbits w/ and w/o contrast along with MRA head and neck w/ and w/o contrast - please specify fat sedimentation view in comments for orbits and MRA imaging     Thank you for this consult - we will continue to follow. Patient to be staffed with attending. Recommendations not final until discussed with attending.     Anitra Chan MD   Neurology, PGY1  General Neurology [id number]      ----------------------------------------------------------------------------------------------------------------------------------------  ATTENDING ATTESTATION    I saw and evaluated the patient on 4/19/2024. I personally obtained the key and critical portions of the history and physical exam or was physically present for key and critical portions performed by the resident/fellow. I reviewed the resident/fellow's documentation and discussed the patient with the resident/fellow. I agree with the resident/fellow's plan as documented in the note that I discussed with the residents and helped formulate.    Chelsea Ford MD  General Neurology Attending  [facility]

## 2024-04-19 NOTE — CONSULTS
"Chief Complaint:     History of Present Illness:  73 year old female with HTN, smoker, and DMII who was sent to the ED by outside ophthalmologist for concern of GCA and increased intraocular pressure in office. For the past 3 months, patient has experienced episodic blurred vision of the right eye more so than left eye. She describes it as \"cracked glass\" like appearance lingering over her central vision. In addition to the blurred vision, she reports weekly episodes of transient vision loss in the right eye. She also endorses severe right temporal headaches that radiate posterior that occur every other day.  She has right scalp tenderness, jaw claudication, and proximal myalgia. She denies fevers. She denies change in speech and denies any personal or family history of stroke. Patient denies pain, redness, discharge, double vision, blind spots, flashes, or floaters.    Past Ocular History: cataract extraction and intraocular lens implantation OU; presbyopia    Past Medical History: please refer to admission HPI    Family History: negative for glaucoma, AMD, and blindness    Medications: please refer to medication reconciliation    Allergies: please refer to patient allergy list        Examination:  Base Eye Exam       Visual Acuity (Snellen - Linear)         Right Left    Near sc 20/25-2 phni 20/40 ph 20/30              Tonometry (Tonopen, 2:28 PM)         Right Left    Pressure 12 12              Pupils         Dark Light Shape React APD    Right 6 5 Round Brisk None    Left 5 4 Round Brisk None   Previously dilated at ophthalmology appointment             Visual Fields (Counting fingers)         Left Right     Full Full              Extraocular Movement         Right Left     Full, Ortho Full, Ortho                  Additional Tests       Color         Right Left    Ishihara 11/11 11/11                  Slit Lamp and Fundus Exam       External Exam         Right Left    External Right temporal tenderness to " palpation Normal              Slit Lamp Exam         Right Left    Lids/Lashes Normal Normal    Conjunctiva/Sclera White and quiet White and quiet    Cornea 1+ inferior SPK 2+ inferior SPK    Anterior Chamber Deep and quiet Deep and quiet    Iris Round and reactive Round and reactive    Lens multifocal IOL s/p YAG multifocal IOL s/p YAG              Fundus Exam         Right Left    Vitreous Normal Normal    Disc Normal Normal    C/D Ratio .2 0.2    Macula Microaneurysms and DBHs Normal    Vessels AV nicking, mild tortuousity AV nicking, mild tortuosity    Periphery few ST DBH Normal                  Labs  4/17/2024- Lipid   Cholesterol- 193 wnl   HDL- 37 (L, 50)   LDL- 111 wnl  TG- 227 (H, 150)  4/17/2024- POC HgA1c- 7.7%  4/17/2024- CBC  Platelets- 282 wnl   Hemoglobin/Hematocrit- 13.9/42.6 wnl           73 year old female with HTN, smoker, and DMII who endorses 3 months history of intermittent blurred vision and amaurosis fugax, right sided temporal headache that radiates posteriorly, scalp tenderness, jaw claudication, and proximal myalgia, concerning for giant cell arteritis. On exam, visual and color acuity are good. Intraocular pressure (IOP) within normal limits. Patient previously dilated at ophthalmology appointment, but pupils are reactive to light with unremarkable slit lamp exam and dilated fundus exam with mildly tortuous vessels with AV crossing OU and few MAs and dot blot hemorrhages OD, suggestive of diabetic and/hypertensive changes.      Impression and Recommendations:  # concern for Giant cell arteritis  - Given good visual acuity, recommend oral prednisone (1mg/kg) around 70mg. Recommend protonix 20mg, Vit D and calcium supplementation, and Bactrim 3x week.  - Recommend ESR and CRP  - Recommend vascular surgery consult for temporal artery biopsy evaluation and scheduling.  - Given history of amaurosis fugax, neurology consulted. Appreciate recommendations.   -Recommend CBC with diff. Patient had  recent HgA1c (elevated) and lipid panel (elevated TGs)  - Recommend CT head wo contrast, duplex carotid US or CTA head and neck, and echocardiogram  - Control modifiable risk factors.   - Will arrange follow up with ophthalmology.    #Diabetic retinopathy, OD  - Patient with dot blot hemorrhages and microaneurysms on exam with recent diagnosis of diabetes.  - A1c 7.7  - Recommend good glycemic control.  - Continue monitoring with ophthalmologist.    #Hypertensive retinopathy, OU  - Patient with AV crossing on dilated exam.  - Monitor and maintain good blood pressures.    Discussed with Dr. Salbador Hanson.    Please contact the ophthalmology service for further questions/comments.  Note not final until signed by attending.  Maryan Giraldo MD PhD  Ophthalmology PGY3  [id number] (adult)/[id number] (peds)  To schedule appointment for adult patients: [phone]  To schedule appointments for pediatric patients: [phone]

## 2024-04-19 NOTE — CONSULTS
History     History Of Present Illness  Ms. Veronica Rice is a 73 y.o. female with history of DM II, HLD presenting from Ophtho clinic due to concern for GCA. For the last 3 months patient has had blurred vision in right eye with right sided headache. The right side of her face is also tender over her right temple. Ophtho consulted Vascular Surgery for temporal artery biopsy.      Past Medical History  HLD  DM II    Surgical History  R knee replacement  cataracts     Social History  Smokes 1/2 ppd  No EtOH, marijuana, cocaine, heroin    Family History  Family History   Problem Relation Name Age of Onset    COPD Mother      Heart failure Mother      Heart disease Mother      No Known Problems Father       Allergies  Meperidine    Review of Systems  A complete and thorough 10 point review of systems was obtained and is negative, except as outlined in the above history of present illness.    Objective     Last Recorded Vitals  /90 (BP Location: Right arm, Patient Position: Sitting)   Pulse 78   Temp 36.4 °C (97.5 °F) (Skin)   Resp 16   Wt 73.5 kg (162 lb)   SpO2 96%     Physical Exam  CONSTITUTIONAL: Alert and oriented. No acute distress. Well-nourished. Well-groomed.  EYES: No scleral icterus. Conjunctiva are normal.  ENMT: Tender over right temple  THORACIC: Symmetrical expansion and chest rise. Normal effort on room air.  CARDIAC: Regular rate and rhythm. No murmurs, gallops, or rubs on auscultation.   VASCULAR: Symmetric, palpable bilateral radial, femoral, DP and PT pulses.  ABDOMINAL: Soft, nontender, nondistended  GENITOURINARY: Deferred  SKIN: Normal turgor. No rashes or ulcers.   MUSCULOSKELETAL: Normal strength and tone.  NEUROLOGICAL: Grossly intact. No focal deficits. Moves all extremities spontaneously.  PSYCHIATRIC: Appropriate mood and affect.      Relevant Results  Labs:  Results from last 7 days   Lab Units 04/17/24  1531   SODIUM mmol/L 141   POTASSIUM mmol/L 4.5   CHLORIDE mmol/L 105   BUN  mg/dL 14   CREATININE mg/dL 0.80     Results from last 7 days   Lab Units 04/19/24  1341 04/17/24  1531   WBC AUTO x10*3/uL 7.2 7.6   HEMOGLOBIN g/dL 14.4 13.9   HEMATOCRIT % 40.6 42.6   PLATELETS AUTO x10*3/uL 273 282          Assessment/Plan   Ms. Veronica Rice is a 73 y.o. female with history of DM II, HLD presenting from Ophtho clinic due to concern for GCA. For the last 3 months patient has had blurred vision in right eye with right sided headache and tenderness over right temple. Ophtho consulted Vascular Surgery for temporal artery biopsy.     Recommendations:  Patient unsure if she would like surgery to diagnose GCA given limited sensitivity of TAB  Patient unlikely to be admitted - can call Vascular Surgery office if she chooses to schedule surgery    Patient discussed with Dr. Saldaña.    Karina Pearson MD  PGY3   General Surgery   Vascular Surgery pager [phone]

## 2024-04-26 ENCOUNTER — PREP FOR PROCEDURE (OUTPATIENT)
Dept: OPERATING ROOM | Facility: CLINIC | Age: 74
End: 2024-04-26
Payer: MEDICARE

## 2024-04-26 DIAGNOSIS — G44.1 OTHER VASCULAR HEADACHE: Primary | ICD-10-CM

## 2024-04-26 PROBLEM — R51.9 CEPHALALGIA: Status: ACTIVE | Noted: 2024-04-26

## 2024-04-29 ENCOUNTER — ANESTHESIA EVENT (OUTPATIENT)
Dept: OPERATING ROOM | Facility: HOSPITAL | Age: 74
End: 2024-04-29
Payer: MEDICARE

## 2024-04-29 ENCOUNTER — HOSPITAL ENCOUNTER (OUTPATIENT)
Facility: HOSPITAL | Age: 74
Setting detail: OUTPATIENT SURGERY
Discharge: HOME | End: 2024-04-29
Attending: SURGERY | Admitting: SURGERY
Payer: MEDICARE

## 2024-04-29 ENCOUNTER — ANESTHESIA (OUTPATIENT)
Dept: OPERATING ROOM | Facility: HOSPITAL | Age: 74
End: 2024-04-29
Payer: MEDICARE

## 2024-04-29 VITALS
DIASTOLIC BLOOD PRESSURE: 56 MMHG | SYSTOLIC BLOOD PRESSURE: 110 MMHG | TEMPERATURE: 97.5 F | HEIGHT: 64 IN | BODY MASS INDEX: 27.59 KG/M2 | RESPIRATION RATE: 16 BRPM | WEIGHT: 161.6 LBS | OXYGEN SATURATION: 95 % | HEART RATE: 67 BPM

## 2024-04-29 DIAGNOSIS — G44.1 OTHER VASCULAR HEADACHE: Primary | ICD-10-CM

## 2024-04-29 PROBLEM — F41.9 ANXIETY: Status: ACTIVE | Noted: 2024-04-29

## 2024-04-29 PROBLEM — I10 HTN (HYPERTENSION): Status: ACTIVE | Noted: 2024-04-29

## 2024-04-29 LAB
GLUCOSE BLD MANUAL STRIP-MCNC: 118 MG/DL (ref 74–99)
GLUCOSE BLD MANUAL STRIP-MCNC: 172 MG/DL (ref 74–99)

## 2024-04-29 PROCEDURE — A4217 STERILE WATER/SALINE, 500 ML: HCPCS | Performed by: SURGERY

## 2024-04-29 PROCEDURE — 7100000001 HC RECOVERY ROOM TIME - INITIAL BASE CHARGE: Performed by: SURGERY

## 2024-04-29 PROCEDURE — 3700000002 HC GENERAL ANESTHESIA TIME - EACH INCREMENTAL 1 MINUTE: Performed by: SURGERY

## 2024-04-29 PROCEDURE — 88305 TISSUE EXAM BY PATHOLOGIST: CPT | Performed by: PATHOLOGY

## 2024-04-29 PROCEDURE — 82947 ASSAY GLUCOSE BLOOD QUANT: CPT

## 2024-04-29 PROCEDURE — 3600000002 HC OR TIME - INITIAL BASE CHARGE - PROCEDURE LEVEL TWO: Performed by: SURGERY

## 2024-04-29 PROCEDURE — 2720000007 HC OR 272 NO HCPCS: Performed by: SURGERY

## 2024-04-29 PROCEDURE — 2500000004 HC RX 250 GENERAL PHARMACY W/ HCPCS (ALT 636 FOR OP/ED): Performed by: STUDENT IN AN ORGANIZED HEALTH CARE EDUCATION/TRAINING PROGRAM

## 2024-04-29 PROCEDURE — 7100000009 HC PHASE TWO TIME - INITIAL BASE CHARGE: Performed by: SURGERY

## 2024-04-29 PROCEDURE — 2500000005 HC RX 250 GENERAL PHARMACY W/O HCPCS: Performed by: SURGERY

## 2024-04-29 PROCEDURE — 7100000010 HC PHASE TWO TIME - EACH INCREMENTAL 1 MINUTE: Performed by: SURGERY

## 2024-04-29 PROCEDURE — C9113 INJ PANTOPRAZOLE SODIUM, VIA: HCPCS | Performed by: SURGERY

## 2024-04-29 PROCEDURE — 2500000004 HC RX 250 GENERAL PHARMACY W/ HCPCS (ALT 636 FOR OP/ED): Performed by: SURGERY

## 2024-04-29 PROCEDURE — 3600000007 HC OR TIME - EACH INCREMENTAL 1 MINUTE - PROCEDURE LEVEL TWO: Performed by: SURGERY

## 2024-04-29 PROCEDURE — 7100000002 HC RECOVERY ROOM TIME - EACH INCREMENTAL 1 MINUTE: Performed by: SURGERY

## 2024-04-29 PROCEDURE — 3700000001 HC GENERAL ANESTHESIA TIME - INITIAL BASE CHARGE: Performed by: SURGERY

## 2024-04-29 PROCEDURE — 37609 LIGATION/BX TEMPORAL ARTERY: CPT | Performed by: SURGERY

## 2024-04-29 PROCEDURE — 88305 TISSUE EXAM BY PATHOLOGIST: CPT | Mod: TC,SUR | Performed by: SURGERY

## 2024-04-29 PROCEDURE — A37609 PR TEMPORAL ARTERY LIGATN OR BX: Performed by: ANESTHESIOLOGY

## 2024-04-29 PROCEDURE — 99100 ANES PT EXTEME AGE<1 YR&>70: CPT | Performed by: ANESTHESIOLOGY

## 2024-04-29 RX ORDER — DROPERIDOL 2.5 MG/ML
0.62 INJECTION, SOLUTION INTRAMUSCULAR; INTRAVENOUS ONCE AS NEEDED
Status: DISCONTINUED | OUTPATIENT
Start: 2024-04-29 | End: 2024-04-29 | Stop reason: HOSPADM

## 2024-04-29 RX ORDER — OXYCODONE HYDROCHLORIDE 5 MG/1
5 TABLET ORAL EVERY 6 HOURS PRN
Qty: 5 TABLET | Refills: 0 | Status: SHIPPED | OUTPATIENT
Start: 2024-04-29 | End: 2024-05-02

## 2024-04-29 RX ORDER — DEXMEDETOMIDINE HYDROCHLORIDE 4 UG/ML
INJECTION, SOLUTION INTRAVENOUS CONTINUOUS PRN
Status: DISCONTINUED | OUTPATIENT
Start: 2024-04-29 | End: 2024-04-29

## 2024-04-29 RX ORDER — PANTOPRAZOLE SODIUM 40 MG/10ML
40 INJECTION, POWDER, LYOPHILIZED, FOR SOLUTION INTRAVENOUS DAILY
Status: DISCONTINUED | OUTPATIENT
Start: 2024-04-29 | End: 2024-04-29 | Stop reason: HOSPADM

## 2024-04-29 RX ORDER — SODIUM CHLORIDE 0.9 G/100ML
IRRIGANT IRRIGATION AS NEEDED
Status: DISCONTINUED | OUTPATIENT
Start: 2024-04-29 | End: 2024-04-29 | Stop reason: HOSPADM

## 2024-04-29 RX ORDER — FENTANYL CITRATE 50 UG/ML
INJECTION, SOLUTION INTRAMUSCULAR; INTRAVENOUS AS NEEDED
Status: DISCONTINUED | OUTPATIENT
Start: 2024-04-29 | End: 2024-04-29

## 2024-04-29 RX ORDER — CEFAZOLIN 1 G/1
INJECTION, POWDER, FOR SOLUTION INTRAVENOUS AS NEEDED
Status: DISCONTINUED | OUTPATIENT
Start: 2024-04-29 | End: 2024-04-29

## 2024-04-29 RX ORDER — ONDANSETRON HYDROCHLORIDE 2 MG/ML
INJECTION, SOLUTION INTRAVENOUS AS NEEDED
Status: DISCONTINUED | OUTPATIENT
Start: 2024-04-29 | End: 2024-04-29

## 2024-04-29 RX ORDER — ACETAMINOPHEN 325 MG/1
650 TABLET ORAL EVERY 4 HOURS PRN
Status: DISCONTINUED | OUTPATIENT
Start: 2024-04-29 | End: 2024-04-29 | Stop reason: HOSPADM

## 2024-04-29 RX ORDER — ONDANSETRON HYDROCHLORIDE 2 MG/ML
4 INJECTION, SOLUTION INTRAVENOUS ONCE AS NEEDED
Status: DISCONTINUED | OUTPATIENT
Start: 2024-04-29 | End: 2024-04-29 | Stop reason: HOSPADM

## 2024-04-29 RX ORDER — PROPOFOL 10 MG/ML
INJECTION, EMULSION INTRAVENOUS CONTINUOUS PRN
Status: DISCONTINUED | OUTPATIENT
Start: 2024-04-29 | End: 2024-04-29

## 2024-04-29 RX ORDER — PANTOPRAZOLE SODIUM 40 MG/10ML
40 INJECTION, POWDER, LYOPHILIZED, FOR SOLUTION INTRAVENOUS DAILY
Status: DISCONTINUED | OUTPATIENT
Start: 2024-04-30 | End: 2024-04-29

## 2024-04-29 RX ORDER — SODIUM CHLORIDE, SODIUM LACTATE, POTASSIUM CHLORIDE, CALCIUM CHLORIDE 600; 310; 30; 20 MG/100ML; MG/100ML; MG/100ML; MG/100ML
100 INJECTION, SOLUTION INTRAVENOUS CONTINUOUS
Status: DISCONTINUED | OUTPATIENT
Start: 2024-04-29 | End: 2024-04-29 | Stop reason: HOSPADM

## 2024-04-29 RX ADMIN — PANTOPRAZOLE SODIUM 40 MG: 40 INJECTION, POWDER, FOR SOLUTION INTRAVENOUS at 14:07

## 2024-04-29 RX ADMIN — PROPOFOL 40 MCG/KG/MIN: 10 INJECTION, EMULSION INTRAVENOUS at 15:01

## 2024-04-29 RX ADMIN — DEXMEDETOMIDINE HYDROCHLORIDE 0.4 MCG/KG/HR: 4 INJECTION, SOLUTION INTRAVENOUS at 15:01

## 2024-04-29 RX ADMIN — CEFAZOLIN 2 G: 1 INJECTION, POWDER, FOR SOLUTION INTRAMUSCULAR; INTRAVENOUS at 14:59

## 2024-04-29 RX ADMIN — ONDANSETRON 4 MG: 2 INJECTION, SOLUTION INTRAMUSCULAR; INTRAVENOUS at 15:33

## 2024-04-29 RX ADMIN — FENTANYL CITRATE 50 MCG: 50 INJECTION, SOLUTION INTRAMUSCULAR; INTRAVENOUS at 15:01

## 2024-04-29 SDOH — HEALTH STABILITY: MENTAL HEALTH: CURRENT SMOKER: 1

## 2024-04-29 ASSESSMENT — ENCOUNTER SYMPTOMS
RESPIRATORY NEGATIVE: 1
ALLERGIC/IMMUNOLOGIC NEGATIVE: 1
CONSTITUTIONAL NEGATIVE: 1
GASTROINTESTINAL NEGATIVE: 1
PSYCHIATRIC NEGATIVE: 1
HEADACHES: 1
EYES NEGATIVE: 1
CARDIOVASCULAR NEGATIVE: 1
MUSCULOSKELETAL NEGATIVE: 1
ENDOCRINE NEGATIVE: 1
HEMATOLOGIC/LYMPHATIC NEGATIVE: 1

## 2024-04-29 ASSESSMENT — PAIN SCALES - GENERAL
PAINLEVEL_OUTOF10: 0 - NO PAIN
PAINLEVEL_OUTOF10: 2
PAINLEVEL_OUTOF10: 0 - NO PAIN

## 2024-04-29 ASSESSMENT — COLUMBIA-SUICIDE SEVERITY RATING SCALE - C-SSRS
1. IN THE PAST MONTH, HAVE YOU WISHED YOU WERE DEAD OR WISHED YOU COULD GO TO SLEEP AND NOT WAKE UP?: NO
2. HAVE YOU ACTUALLY HAD ANY THOUGHTS OF KILLING YOURSELF?: NO
6. HAVE YOU EVER DONE ANYTHING, STARTED TO DO ANYTHING, OR PREPARED TO DO ANYTHING TO END YOUR LIFE?: NO

## 2024-04-29 ASSESSMENT — PAIN - FUNCTIONAL ASSESSMENT
PAIN_FUNCTIONAL_ASSESSMENT: 0-10

## 2024-04-29 NOTE — ANESTHESIA POSTPROCEDURE EVALUATION
Patient: Veronica Rice    Procedure Summary       Date: 04/29/24 Room / Location: [facility] OR 12 / [facility] OR    Anesthesia Start: 1459 Anesthesia Stop: 1603    Procedure: Biopsy Temporal Artery (Right) Diagnosis:       Other vascular headache      (Other vascular headache [G44.1])    Surgeons: Tristan Saldaña MD Responsible Provider: Brad Dyer MD    Anesthesia Type: MAC ASA Status: 3            Anesthesia Type: MAC    Vitals Value Taken Time   BP 95/55 04/29/24 1603   Temp 36 04/29/24 1603   Pulse 64 04/29/24 1600   Resp 14 04/29/24 1603   SpO2 96 % 04/29/24 1600   Vitals shown include unfiled device data.    Anesthesia Post Evaluation    Patient location during evaluation: PACU  Patient participation: complete - patient participated  Level of consciousness: awake  Pain management: adequate  Airway patency: patent  Cardiovascular status: acceptable  Respiratory status: room air  Hydration status: acceptable  Postoperative Nausea and Vomiting: none        No notable events documented.

## 2024-04-29 NOTE — SIGNIFICANT EVENT
Spoke with Dr. Weiss with Anesthesia made aware of BP not within 20% of preop. Currently  106/56. Ok for pt to be discharged without any further orders.

## 2024-04-29 NOTE — H&P
History Of Present Illness  Veronica Rice is a 73 y.o. female presenting with c/f GCA with planned temporal artery biopsy today. Patient reports ongoing headaches today.     Past Medical History  Past Medical History:   Diagnosis Date    DM (diabetes mellitus) (Multi)     HLD (hyperlipidemia)        Surgical History  Past Surgical History:   Procedure Laterality Date    KNEE SURGERY          Social History  She reports that she has been smoking cigarettes. She has never used smokeless tobacco. She reports that she does not drink alcohol and does not use drugs.    Family History  Family History   Problem Relation Name Age of Onset    COPD Mother      Heart failure Mother      Heart disease Mother      No Known Problems Father          Allergies  Meperidine    Review of Systems   Constitutional: Negative.    HENT: Negative.     Eyes: Negative.    Respiratory: Negative.     Cardiovascular: Negative.    Gastrointestinal: Negative.    Endocrine: Negative.    Genitourinary: Negative.    Musculoskeletal: Negative.    Skin: Negative.    Allergic/Immunologic: Negative.    Neurological:  Positive for headaches.   Hematological: Negative.    Psychiatric/Behavioral: Negative.          Physical Exam  Constitutional:       General: She is not in acute distress.     Appearance: Normal appearance. She is not toxic-appearing.   HENT:      Head: Normocephalic.      Nose: Nose normal.      Mouth/Throat:      Mouth: Mucous membranes are moist.      Pharynx: No posterior oropharyngeal erythema.   Eyes:      General: No scleral icterus.     Extraocular Movements: Extraocular movements intact.      Pupils: Pupils are equal, round, and reactive to light.   Cardiovascular:      Rate and Rhythm: Normal rate and regular rhythm.      Pulses: Normal pulses.      Heart sounds: Normal heart sounds.   Pulmonary:      Effort: Pulmonary effort is normal.      Breath sounds: Normal breath sounds.   Musculoskeletal:         General: Normal range of  "motion.      Cervical back: Normal range of motion and neck supple.   Lymphadenopathy:      Cervical: No cervical adenopathy.   Skin:     General: Skin is warm.      Findings: No rash.   Neurological:      General: No focal deficit present.      Mental Status: She is alert and oriented to person, place, and time.      Cranial Nerves: No cranial nerve deficit.   Psychiatric:         Mood and Affect: Mood normal.         Behavior: Behavior normal.          Last Recorded Vitals  Blood pressure 162/70, pulse 72, temperature 36.2 °C (97.2 °F), temperature source Axillary, resp. rate 16, height 1.626 m (5' 4\"), weight 73.3 kg (161 lb 9.6 oz), SpO2 96%.    Relevant Results  Results for orders placed or performed during the hospital encounter of 04/29/24 (from the past 24 hour(s))   POCT GLUCOSE   Result Value Ref Range    POCT Glucose 118 (H) 74 - 99 mg/dL       Assessment/Plan   Principal Problem:    Cephalalgia  Active Problems:    Diabetes mellitus, type 2 (Multi)    Anxiety    HTN (hypertension)      73F who presents today for temporal artery biopsy 2/2 c/f GCA. Will plan for temporal artery biopsy today.      Radha Dasilva MD  PGY-3 General Surgery  Vascular Surgery [phone]    "

## 2024-04-29 NOTE — ANESTHESIA PREPROCEDURE EVALUATION
Patient: Veronica Rice    Procedure Information       Date/Time: 04/29/24 1400    Procedure: Biopsy Temporal Artery    Location: [facility] OR 12 / [facility]    Surgeons: Tristan Saldaña MD            Relevant Problems   Anesthesia (within normal limits)      Cardiac  Echo in 2016 wnl    (+) HTN (hypertension)   (+) Hyperlipidemia      Pulmonary (within normal limits)      Neuro   (+) Anxiety      GI (within normal limits)      Liver (within normal limits)      Endocrine  On steroids 70mg of prednisone, has been taking it for 10days    (+) Diabetes mellitus, type 2 (Multi)      Hematology (within normal limits)      ID (within normal limits)       Clinical information reviewed:   Tobacco  Allergies    Med Hx  Surg Hx   Fam Hx  Soc Hx        NPO Detail:  NPO/Void Status  Date of Last Liquid: 04/29/24  Time of Last Liquid: 0000  Date of Last Solid: 04/29/24  Time of Last Solid: 0000         Physical Exam    Airway  Mallampati: II  TM distance: >3 FB  Neck ROM: full     Cardiovascular - normal exam  Rhythm: regular     Dental    Pulmonary - normal exam     Abdominal - normal exam         Anesthesia Plan    History of general anesthesia?: yes  History of complications of general anesthesia?: no    ASA 3     MAC     The patient is a current smoker.  Patient was not previously instructed to abstain from smoking on day of procedure.  Patient did not smoke on day of procedure.    intravenous induction   Postoperative administration of opioids is intended.  Anesthetic plan and risks discussed with patient.  Use of blood products discussed with patient who consented to blood products.    Plan discussed with attending.

## 2024-04-30 DIAGNOSIS — E11.65 TYPE 2 DIABETES MELLITUS WITH HYPERGLYCEMIA, WITHOUT LONG-TERM CURRENT USE OF INSULIN (MULTI): Primary | ICD-10-CM

## 2024-04-30 RX ORDER — ATORVASTATIN CALCIUM 20 MG/1
20 TABLET, FILM COATED ORAL DAILY
Qty: 100 TABLET | Refills: 3 | Status: SHIPPED | OUTPATIENT
Start: 2024-04-30 | End: 2024-05-01 | Stop reason: SDUPTHER

## 2024-04-30 NOTE — PROGRESS NOTES
"Assessment and Plan    08/19/2016 CT head without contrast, which I personally reviewed, shows no acute lesion.  12/16/2010 MRI brain with contrast, which I personally reviewed, shows few scattered bihemispheric white matter FLAIR lesions.  Prior head imaging.    04/29/2024 pathology \"A. Temporal artery, right, biopsy:  - Negative for arteritis.  See note     Note:  Several H&E levels were examined.  No evidence of active or healed arteritis is seen.  Clinical correlation is recommended.\"    Lab Results   Component Value Date/Time    SEDRATE 12 04/19/2024 1341    SEDRATE 15 01/08/2018 1205    CRP 0.29 04/19/2024 1341      04/17/2024 lipid panel with . HbA1c 7.7%.    05/01/2024 OCT RNFL  & .    05/01/2024 HVF 24-2 OD fovea 37, wnl MD +0.26 & OS fovea 35, wnl MD -0.26.    This 73 year-old woman with a history of DM2, HTN, HLD, smoking presents for evaluation of headaches with possible giant cell arteritis.    Her transient vision loss episodes are consistent with transient ischemia. Eye examination shows several abnormalities in the right eye concerning for ischemia. In particular, carotid stenosis or dissection is a major concern. Concerns had been for giant cell arteritis based on symptoms, but I again am concerned more for other carotid circulation disease given negative temporal artery biopsy. We discussed imaging and possibly carotid endarterectomy with options for outpatient and inpatient evaluation. She also should have medical risk reduction.    Plan    Obtain ultrasound carotid arteries as planned.  Start aspirin 325 mg PO Qday.  Start atorvastatin with 40 mg dosing.  Treat HTN & DM2.  Taper off prednisone, lower 20 mg each day until off.  Smoking cessation.  Stroke precautions with immediate trip to emergency department for concerning symptoms of stroke.    Follow up in 3-4 weeks. (Dilated 5/1/2024)  "

## 2024-04-30 NOTE — OP NOTE
Biopsy Temporal Artery (R) Operative Note     Date: 2024  OR Location: [facility] OR    Name: Veronica Rice, : 1950, Age: 73 y.o., MRN: [id number], Sex: female    Diagnosis  Pre-op Diagnosis     * Other vascular headache [G44.1] Post-op Diagnosis     * Other vascular headache [G44.1]     Procedures  Biopsy Temporal Artery  54516 - MT LIGATION/BIOPSY TEMPORAL ARTERY  Right side    Surgeons      * Tristan Saldaña - Primary    Resident/Fellow/Other Assistant:  Surgeons and Role:     * Radha Dasilva MD - Resident - Assisting     * Yuval Bishop MD - Fellow    Procedure Summary  Anesthesia: Moderate Sedation  ASA: III  Anesthesia Staff: Anesthesiologist: Brad Dyer MD  C-AA: ELSY Acosta  Anesthesia Resident: Efrain Mary MD  Estimated Blood Loss: 10mL  Intra-op Medications:   Administrations occurring from 1400 to 1555 on 24:   Medication Name Total Dose   sodium chloride 0.9 % irrigation solution 1,000 mL   ceFAZolin (Ancef) 1 g in sodium chloride 0.9 % 1,000 mL irrigation 1 g   bupivacaine PF (Marcaine) 0.25 % (2.5 mg/mL) 30 mL, lidocaine PF (Xylocaine) 10 mg/mL (1 %) 30 mL, sodium bicarbonate 3 mEq syringe 7 mL   pantoprazole (ProtoNix) injection 40 mg 40 mg              Anesthesia Record               Intraprocedure I/O Totals          Intake    Dexmedetomidine 0.00 mL    The total shown is the total volume documented since Anesthesia Start was filed.    Propofol Drip 0.00 mL    The total shown is the total volume documented since Anesthesia Start was filed.    Total Intake 0 mL          Specimen:   ID Type Source Tests Collected by Time   1 : RIGHT TEMPORAL ARTERY BIOPSY Tissue TEMPORAL ARTERY BIOPSY SURGICAL PATHOLOGY EXAM Tristan Saldaña MD 2024 [id number]        Staff:   Circulator: Deidra Pemberton RN; Mariel Kenny RN  Scrub Person: Shannan Dubois; Deloris Khan     Drains and/or Catheters: * None in log *      Indications: Veronica Rice is an 73 y.o. female who is having  surgery for Other vascular headache [G44.1]. S he has ongoing headaches with concern for giant cell arteritis by Dr. Hanson. Here for biopsy - since right side is predominant location of headache, will biopsy right.     The patient was seen in the preoperative area. The risks, benefits, complications, treatment options, non-operative alternatives, expected recovery and outcomes were discussed with the patient. The possibilities of reaction to medication, pulmonary aspiration, injury to surrounding structures, bleeding, recurrent infection, the need for additional procedures, failure to diagnose a condition, and creating a complication requiring transfusion or operation were discussed with the patient. The patient concurred with the proposed plan, giving informed consent.  The site of surgery was properly noted/marked if necessary per policy. The patient has been actively warmed in preoperative area. Preoperative antibiotics are not indicated. Venous thrombosis prophylaxis have been ordered including bilateral sequential compression devices    Procedure Details: The patient was brought to the operating room and placed on the operating room table in supine position. Appropriate time out and a huddle was performed by name, date of birth, and medical record number. The patient underwent MAC anesthesia.     The right Temporal area of the head was clipped, sterilized with Marla-Hex soap, and draped.  Antibiotics were given.  We then proceeded to do local anesthetic and after that we used a 15 blade to make about a 3 to 4 cm incision got down the soft tissue with Bovie cautery.  Opened up the fascia to expose the temporal artery.  Confirmed the arterial segment was harvested for the biopsy.  This was of a length of about 3 to 3-1/2 cm.  3-0 silk ties were used to proximally and distally ligate the temporal artery biopsy.  Any side branches were tied with 4-0 silk ties.  Bovie cautery was also used for hemostasis.     The  specimen was handed off and sent to pathology in formalin.  Irrigation was used in the wound and then we proceeded to closed with 3-0 Vicryl figure-of-eight interrupted fashion.  This was followed by 4-0 Monocryl subcuticular running stitch and LiquiBand glue.     At this point the procedure was completed.  All counts were correct.  There were no complications.  The patient was taken to the recovery area in stable condition.  I was present for the entire case.       Complications:  None; patient tolerated the procedure well.    Disposition: PACU - hemodynamically stable.  Condition: stable     Attending Attestation: I was present and scrubbed for the entire procedure.    Tristan Saldaña  Phone Number: [phone]

## 2024-05-01 ENCOUNTER — OFFICE VISIT (OUTPATIENT)
Dept: OPHTHALMOLOGY | Facility: CLINIC | Age: 74
End: 2024-05-01
Payer: MEDICARE

## 2024-05-01 DIAGNOSIS — E11.65 TYPE 2 DIABETES MELLITUS WITH HYPERGLYCEMIA, WITHOUT LONG-TERM CURRENT USE OF INSULIN (MULTI): Primary | ICD-10-CM

## 2024-05-01 DIAGNOSIS — H40.213 ACUTE ANGLE-CLOSURE GLAUCOMA, BILATERAL: ICD-10-CM

## 2024-05-01 DIAGNOSIS — H53.121 TRANSIENT VISUAL LOSS OF RIGHT EYE: ICD-10-CM

## 2024-05-01 LAB
LABORATORY COMMENT REPORT: NORMAL
PATH REPORT.FINAL DX SPEC: NORMAL
PATH REPORT.GROSS SPEC: NORMAL
PATH REPORT.RELEVANT HX SPEC: NORMAL
PATH REPORT.TOTAL CANCER: NORMAL

## 2024-05-01 PROCEDURE — 92133 CPTRZD OPH DX IMG PST SGM ON: CPT | Performed by: PSYCHIATRY & NEUROLOGY

## 2024-05-01 PROCEDURE — 99205 OFFICE O/P NEW HI 60 MIN: CPT | Performed by: PSYCHIATRY & NEUROLOGY

## 2024-05-01 PROCEDURE — 92083 EXTENDED VISUAL FIELD XM: CPT | Performed by: PSYCHIATRY & NEUROLOGY

## 2024-05-01 RX ORDER — ATORVASTATIN CALCIUM 40 MG/1
40 TABLET, FILM COATED ORAL DAILY
Qty: 30 TABLET | Refills: 11 | Status: SHIPPED | OUTPATIENT
Start: 2024-05-01 | End: 2024-05-22 | Stop reason: DRUGHIGH

## 2024-05-01 RX ORDER — ASPIRIN 325 MG
325 TABLET, DELAYED RELEASE (ENTERIC COATED) ORAL DAILY
Qty: 30 TABLET | Refills: 11 | Status: SHIPPED | OUTPATIENT
Start: 2024-05-01 | End: 2025-05-01

## 2024-05-01 ASSESSMENT — VISUAL ACUITY
OD_SC: 20/25
OS_PH_SC+: -2
OD_PH_SC+: -1
METHOD: SNELLEN - LINEAR
OS_PH_SC: 20/25
OD_SC+: -3
OS_SC: 20/25
OD_PH_SC: 20/25
OS_SC+: -3

## 2024-05-01 ASSESSMENT — CUP TO DISC RATIO
OS_RATIO: 0.2
OD_RATIO: .2

## 2024-05-01 ASSESSMENT — TONOMETRY
OD_IOP_MMHG: 12
IOP_METHOD: GOLDMANN APPLANATION
OS_IOP_MMHG: 11

## 2024-05-01 ASSESSMENT — ENCOUNTER SYMPTOMS
NEUROLOGICAL NEGATIVE: 0
RESPIRATORY NEGATIVE: 0
MUSCULOSKELETAL NEGATIVE: 0
ALLERGIC/IMMUNOLOGIC NEGATIVE: 0
PSYCHIATRIC NEGATIVE: 0
GASTROINTESTINAL NEGATIVE: 0
CONSTITUTIONAL NEGATIVE: 0
CARDIOVASCULAR NEGATIVE: 0
ENDOCRINE NEGATIVE: 0
HEMATOLOGIC/LYMPHATIC NEGATIVE: 0
EYES NEGATIVE: 1

## 2024-05-01 ASSESSMENT — SLIT LAMP EXAM - LIDS
COMMENTS: NORMAL
COMMENTS: NORMAL

## 2024-05-01 ASSESSMENT — CONF VISUAL FIELD
OS_NORMAL: 1
OD_SUPERIOR_NASAL_RESTRICTION: 0
OD_INFERIOR_TEMPORAL_RESTRICTION: 0
OD_NORMAL: 1
OS_INFERIOR_NASAL_RESTRICTION: 0
OS_SUPERIOR_TEMPORAL_RESTRICTION: 0
METHOD: COUNTING FINGERS
OS_INFERIOR_TEMPORAL_RESTRICTION: 0
OD_INFERIOR_NASAL_RESTRICTION: 0
OS_SUPERIOR_NASAL_RESTRICTION: 0
OD_SUPERIOR_TEMPORAL_RESTRICTION: 0

## 2024-05-01 ASSESSMENT — EXTERNAL EXAM - LEFT EYE: OS_EXAM: NORMAL

## 2024-05-02 ENCOUNTER — HOSPITAL ENCOUNTER (OUTPATIENT)
Dept: RADIOLOGY | Facility: CLINIC | Age: 74
Discharge: HOME | End: 2024-05-02
Payer: MEDICARE

## 2024-05-02 DIAGNOSIS — F32.A DEPRESSION, UNSPECIFIED DEPRESSION TYPE: ICD-10-CM

## 2024-05-02 PROCEDURE — 75571 CT HRT W/O DYE W/CA TEST: CPT

## 2024-05-06 ENCOUNTER — HOSPITAL ENCOUNTER (EMERGENCY)
Facility: HOSPITAL | Age: 74
Discharge: HOME | End: 2024-05-06
Attending: STUDENT IN AN ORGANIZED HEALTH CARE EDUCATION/TRAINING PROGRAM
Payer: MEDICARE

## 2024-05-06 ENCOUNTER — APPOINTMENT (OUTPATIENT)
Dept: RADIOLOGY | Facility: HOSPITAL | Age: 74
End: 2024-05-06
Payer: MEDICARE

## 2024-05-06 VITALS
TEMPERATURE: 97.5 F | BODY MASS INDEX: 27.49 KG/M2 | DIASTOLIC BLOOD PRESSURE: 75 MMHG | SYSTOLIC BLOOD PRESSURE: 113 MMHG | OXYGEN SATURATION: 98 % | HEART RATE: 83 BPM | RESPIRATION RATE: 17 BRPM | WEIGHT: 161 LBS | HEIGHT: 64 IN

## 2024-05-06 DIAGNOSIS — R51.9 NONINTRACTABLE HEADACHE, UNSPECIFIED CHRONICITY PATTERN, UNSPECIFIED HEADACHE TYPE: Primary | ICD-10-CM

## 2024-05-06 DIAGNOSIS — R91.8 LUNG NODULES: ICD-10-CM

## 2024-05-06 DIAGNOSIS — R53.1 GENERALIZED WEAKNESS: ICD-10-CM

## 2024-05-06 DIAGNOSIS — T38.0X5A STEROID SIDE EFFECTS, INITIAL ENCOUNTER: ICD-10-CM

## 2024-05-06 LAB
ALBUMIN SERPL BCP-MCNC: 4 G/DL (ref 3.4–5)
ALP SERPL-CCNC: 64 U/L (ref 33–136)
ALT SERPL W P-5'-P-CCNC: 22 U/L (ref 7–45)
ANION GAP SERPL CALC-SCNC: 12 MMOL/L (ref 10–20)
AST SERPL W P-5'-P-CCNC: 22 U/L (ref 9–39)
BASOPHILS # BLD AUTO: 0.05 X10*3/UL (ref 0–0.1)
BASOPHILS NFR BLD AUTO: 0.4 %
BILIRUB SERPL-MCNC: 0.7 MG/DL (ref 0–1.2)
BUN SERPL-MCNC: 22 MG/DL (ref 6–23)
CALCIUM SERPL-MCNC: 8.7 MG/DL (ref 8.6–10.6)
CHLORIDE SERPL-SCNC: 99 MMOL/L (ref 98–107)
CO2 SERPL-SCNC: 28 MMOL/L (ref 21–32)
CREAT SERPL-MCNC: 0.91 MG/DL (ref 0.5–1.05)
EGFRCR SERPLBLD CKD-EPI 2021: 66 ML/MIN/1.73M*2
EOSINOPHIL # BLD AUTO: 0.2 X10*3/UL (ref 0–0.4)
EOSINOPHIL NFR BLD AUTO: 1.6 %
ERYTHROCYTE [DISTWIDTH] IN BLOOD BY AUTOMATED COUNT: 13.8 % (ref 11.5–14.5)
GLUCOSE BLD MANUAL STRIP-MCNC: 155 MG/DL (ref 74–99)
GLUCOSE BLD MANUAL STRIP-MCNC: 233 MG/DL (ref 74–99)
GLUCOSE SERPL-MCNC: 183 MG/DL (ref 74–99)
HCT VFR BLD AUTO: 41.6 % (ref 36–46)
HGB BLD-MCNC: 14.1 G/DL (ref 12–16)
HOLD SPECIMEN: NORMAL
HOLD SPECIMEN: NORMAL
IMM GRANULOCYTES # BLD AUTO: 0.06 X10*3/UL (ref 0–0.5)
IMM GRANULOCYTES NFR BLD AUTO: 0.5 % (ref 0–0.9)
LYMPHOCYTES # BLD AUTO: 2.25 X10*3/UL (ref 0.8–3)
LYMPHOCYTES NFR BLD AUTO: 18.3 %
MAGNESIUM SERPL-MCNC: 2.21 MG/DL (ref 1.6–2.4)
MCH RBC QN AUTO: 30.3 PG (ref 26–34)
MCHC RBC AUTO-ENTMCNC: 33.9 G/DL (ref 32–36)
MCV RBC AUTO: 90 FL (ref 80–100)
MONOCYTES # BLD AUTO: 0.79 X10*3/UL (ref 0.05–0.8)
MONOCYTES NFR BLD AUTO: 6.4 %
NEUTROPHILS # BLD AUTO: 8.92 X10*3/UL (ref 1.6–5.5)
NEUTROPHILS NFR BLD AUTO: 72.8 %
NRBC BLD-RTO: 0 /100 WBCS (ref 0–0)
PHOSPHATE SERPL-MCNC: 3.9 MG/DL (ref 2.5–4.9)
PLATELET # BLD AUTO: 251 X10*3/UL (ref 150–450)
POTASSIUM SERPL-SCNC: 5.4 MMOL/L (ref 3.5–5.3)
PROT SERPL-MCNC: 6.5 G/DL (ref 6.4–8.2)
RBC # BLD AUTO: 4.65 X10*6/UL (ref 4–5.2)
SODIUM SERPL-SCNC: 134 MMOL/L (ref 136–145)
WBC # BLD AUTO: 12.3 X10*3/UL (ref 4.4–11.3)

## 2024-05-06 PROCEDURE — 84100 ASSAY OF PHOSPHORUS: CPT

## 2024-05-06 PROCEDURE — 99285 EMERGENCY DEPT VISIT HI MDM: CPT | Performed by: STUDENT IN AN ORGANIZED HEALTH CARE EDUCATION/TRAINING PROGRAM

## 2024-05-06 PROCEDURE — 70498 CT ANGIOGRAPHY NECK: CPT | Performed by: RADIOLOGY

## 2024-05-06 PROCEDURE — 85025 COMPLETE CBC W/AUTO DIFF WBC: CPT

## 2024-05-06 PROCEDURE — 2500000004 HC RX 250 GENERAL PHARMACY W/ HCPCS (ALT 636 FOR OP/ED): Performed by: STUDENT IN AN ORGANIZED HEALTH CARE EDUCATION/TRAINING PROGRAM

## 2024-05-06 PROCEDURE — 70496 CT ANGIOGRAPHY HEAD: CPT | Performed by: RADIOLOGY

## 2024-05-06 PROCEDURE — 83735 ASSAY OF MAGNESIUM: CPT

## 2024-05-06 PROCEDURE — 82947 ASSAY GLUCOSE BLOOD QUANT: CPT

## 2024-05-06 PROCEDURE — 82947 ASSAY GLUCOSE BLOOD QUANT: CPT | Mod: 91

## 2024-05-06 PROCEDURE — 2550000001 HC RX 255 CONTRASTS: Performed by: STUDENT IN AN ORGANIZED HEALTH CARE EDUCATION/TRAINING PROGRAM

## 2024-05-06 PROCEDURE — 36415 COLL VENOUS BLD VENIPUNCTURE: CPT | Performed by: STUDENT IN AN ORGANIZED HEALTH CARE EDUCATION/TRAINING PROGRAM

## 2024-05-06 PROCEDURE — 80053 COMPREHEN METABOLIC PANEL: CPT

## 2024-05-06 PROCEDURE — 99284 EMERGENCY DEPT VISIT MOD MDM: CPT | Mod: 25

## 2024-05-06 PROCEDURE — 70498 CT ANGIOGRAPHY NECK: CPT

## 2024-05-06 RX ADMIN — PREDNISONE 70 MG: 20 TABLET ORAL at 11:36

## 2024-05-06 RX ADMIN — IOHEXOL 90 ML: 350 INJECTION, SOLUTION INTRAVENOUS at 12:43

## 2024-05-06 ASSESSMENT — LIFESTYLE VARIABLES
EVER FELT BAD OR GUILTY ABOUT YOUR DRINKING: NO
TOTAL SCORE: 0
EVER HAD A DRINK FIRST THING IN THE MORNING TO STEADY YOUR NERVES TO GET RID OF A HANGOVER: NO
HAVE YOU EVER FELT YOU SHOULD CUT DOWN ON YOUR DRINKING: NO
HAVE PEOPLE ANNOYED YOU BY CRITICIZING YOUR DRINKING: NO

## 2024-05-06 ASSESSMENT — PAIN - FUNCTIONAL ASSESSMENT: PAIN_FUNCTIONAL_ASSESSMENT: 0-10

## 2024-05-06 ASSESSMENT — COLUMBIA-SUICIDE SEVERITY RATING SCALE - C-SSRS
2. HAVE YOU ACTUALLY HAD ANY THOUGHTS OF KILLING YOURSELF?: NO
6. HAVE YOU EVER DONE ANYTHING, STARTED TO DO ANYTHING, OR PREPARED TO DO ANYTHING TO END YOUR LIFE?: NO
1. IN THE PAST MONTH, HAVE YOU WISHED YOU WERE DEAD OR WISHED YOU COULD GO TO SLEEP AND NOT WAKE UP?: NO

## 2024-05-06 ASSESSMENT — PAIN SCALES - GENERAL: PAINLEVEL_OUTOF10: 0 - NO PAIN

## 2024-05-06 NOTE — ED PROVIDER NOTES
CC: Weakness, Gen and Headache     History provided by: Patient  Limitations to History: None    HPI:  Veronica Rice is a 73 y.o. with PMH of DM, tobacco abuse, and anxiety presenting to the ED for fatigue and generalized weakness. Patient reports that she woke up at 4 am and was feeling severally fatigued. She notes that she felt that all her muscles were giving out with the worst weakness in her back region. The fatigue has been constant for >1 year and patient notes she sleeps 14+ hours a day, but today she felt the most fatigue she has ever felt in her life. She denied any falls, loss of consciousness, chest pain, shortness of breath, or numbness/ tingling in her fingers or toes. Endorses chronic loss of bladder control and wears panty liners daily. She has smoked 1/2 a pack of cigarettes a day for most of her lifer, but denies any alcohol use. Patient notes that during her episode this morning, she was experiencing mild nausea as well as a dull, achy R sided headache which comes and goes. Of note, patient started to have severe headaches in February of this year, with initial suspicion for GCA with subsequent temporal artery biopsy negative for arteritis 4/29. Patient was tapered off 70 mg prednisone by 20 mg per day from 5/1-5/4 with last dose on Saturday following her negative biopsy result.     External Records Reviewed: 4/20 ED note, 4/29 vascular surg op note, 5/1 optho outpatient note  ???????????????????????????????????????????????????????????????  Triage Vitals:  T 36.4 °C (97.5 °F)  HR 84  BP (!) 126/91  RR 16  O2 98 %      Vital signs reviewed in nursing triage note, EMR flow sheets, and at patient's bedside.   General: Awake, alert, in no acute distress  Eyes: Gaze conjugate.  No scleral icterus or injection  HENT: Normo-cephalic, scar on right temporal scalp. No stridor. No rhinorrhea or epistaxis.  CV: Regular rhythm. No murmurs appreciated. Radial pulses 2+ bilaterally  Respiratory: Breathing  non-labored, speaking in full sentences.  Clear to auscultation bilaterally  GI: Soft, non-distended, non-tender. No rebound or guarding.  MSK/Extremities: No gross bony deformities. Moving all extremities. No lower extremity edema. Difficulty sitting up from flat position.  Skin: Warm. Appropriate color  Neuro: Alert. Oriented. Face symmetric. Speech is fluent.  Gross strength and sensation intact in b/l UE and LES. Mild tremor.   Psych: Appropriate mood and affect   ???????????????????????????????????????????????????????????????  ED Course/Treatment/Medical Decision Making  MDM:  Veronica Rice is a 73 y.o. with PMH of DM, tobacco abuse, and anxiety presenting to the ED for fatigue and generalized weakness and was worked up for steroid withdrawal vs stroke vs anemia vs electrolyte abnormalities. Patient recently was worked up for suspected GCA, which was treated with high dose prednisone. Temporal artery biopsy 4/29 was negative for GCA and patient was tapered of 70 mg prednisone from 5/1-5/4. In the ED, patient received 70 mg prednisone for suspected prednisone withdrawal symptoms and subsequently had improved energy and mobility. Labs were relevant for mild leukocytosis (12.3) without anemia or significant electrolyte abnormalities. CT angio of the head and neck was negative for flow-limiting stenosis or occlusion is identified within the head or neck. CT head showed no acute evidence of infarct or intracranial hemorrhage. Patient's symptoms were determined to be due to prednisone withdrawal and patient will be sent home on a longer prednisone taper (decrease from 70 mg by 10 mg every two days) with PCP and neuro-optho follow up outpatient. Patient is aware she still needs to complete outpatient imaging including doppler US carotid, MR angio head/neck,MRI brain, CT chest, U/S abdomen, DEXA bone scan, and mammogram per primary physicians.    Differential diagnoses considered include but are not limited to:  Prednisone withdrawal, anemia, electrolyte abnormality, stroke    Independent Interpretation of Studies:  I independently interpreted: See ED Course Below  -Labs; cbc, cmp    ED Course:  ED Course as of 05/06/24 1337   Mon May 06, 2024   1142 Senior Resident Attestation  Patient seen and discussed with guillermo resident.  I have independently evaluated the patient and reviewed all necessary laboratory and radiographic results.    73-year-old female with history of hypertension, diabetes, recently worked up for giant cell arteritis, had temporal artery biopsy done last week with pathology results that came back negative, tapered off her 70 mg of prednisone daily, last dose was yesterday coming to the ED with generalized fatigue, malaise, and weakness.  Patient does have mild resting tremor on initial evaluation, but no deficits suggestive of acute ischemic stroke.  Patient also having difficulty due to generalized weakness and transferring from wheelchair to ED stretcher.  She is scheduled for outpatient CTA head and neck, we will obtain this today in addition to a CT head.  On differential is also steroid withdrawal/adrenal insufficiency, we will give the patient dose of patient's initial 70 mg of prednisone and reassess.  Blood work also ordered.    Patient seen and discussed with ED attending physician.    Richardson Saleh MD  PGY 3 Emergency Medicine [SH]   1211 POCT GLUCOSE(!) [YA]   1311 Lymphocytes %: 18.3 [YA]      ED Course User Index  [SH] Anshu Saleh MD  [YA] Ana Valenzuela MD         Diagnoses as of 05/06/24 1337   Nonintractable headache, unspecified chronicity pattern, unspecified headache type   Generalized weakness   Steroid side effects, initial encounter       The patient was monitored for any change in vital signs or symptoms throughout the ED course.     Social Determinants Limiting Care:  None identified    Impression:  Patient was rapidly tapered off prednisone 70 mg by 20 mg daily between  5/1-5/4. Patient subsequently presented with extreme fatigue and weakness suggestive of prednisone withdrawal. Stroke workup negative. Labs without any severe electrolyte abnormalities or anemia. Outpatient labs with normal TSH.     Disposition:  Patient will be discharged home with a longer prednisone taper. Patient reports she has a large amount of prednisone at home and doesn't need a prescription from the ED.    Prednisone taper is as follows:  5/7/24: 70 mg  5/8/24: 60 mg  5/9/24: 60 mg  5/10/24: 50 mg  5/11/24: 50 mg  5/12/24: 40 mg  5/13/24: 40 mg  5/14/24: 30 mg  5/15/24: 30 mg  5/16/24: 20 mg  5/17/24: 20 mg  5/18/24: 10 mg  5/19/24: 10 mg  5/20/24: 0 mg      Assessment and plan discussed with Dr. Delfino Valenzuela MD     Disclaimer: This note was dictated by speech recognition. Minor errors in transcription may be present.     Procedures ? SmartLinks last updated 5/6/2024 11:28 AM        Ana Valenzuela MD  Resident  05/06/24 [id number]

## 2024-05-06 NOTE — DISCHARGE INSTRUCTIONS
Please take a longer prednisone taper as follows:   5/7/24: 70 mg  5/8/24: 60 mg  5/9/24: 60 mg  5/10/24: 50 mg  5/11/24: 50 mg  5/12/24: 40 mg  5/13/24: 40 mg  5/14/24: 30 mg  5/15/24: 30 mg  5/16/24: 20 mg  5/17/24: 20 mg  5/18/24: 10 mg  5/19/24: 10 mg  5/20/24: 0 mg

## 2024-05-06 NOTE — ED TRIAGE NOTES
Pt presents to the ED with nausea, generalized weakness, and headaches that started today. Per  pt is also much sleepier than normal. Pmhx of possible GCA. Pt was told if sx worsened to come to the ER. Delfino MARTINEZ to triage to evaluate pt.

## 2024-05-08 ENCOUNTER — HOSPITAL ENCOUNTER (OUTPATIENT)
Dept: RADIOLOGY | Facility: HOSPITAL | Age: 74
Discharge: HOME | End: 2024-05-08
Payer: MEDICARE

## 2024-05-08 ENCOUNTER — TELEPHONE (OUTPATIENT)
Dept: PRIMARY CARE | Facility: CLINIC | Age: 74
End: 2024-05-08
Payer: MEDICARE

## 2024-05-08 VITALS — HEIGHT: 64 IN | WEIGHT: 161 LBS | BODY MASS INDEX: 27.49 KG/M2

## 2024-05-08 DIAGNOSIS — M81.0 OSTEOPOROSIS, UNSPECIFIED OSTEOPOROSIS TYPE, UNSPECIFIED PATHOLOGICAL FRACTURE PRESENCE: ICD-10-CM

## 2024-05-08 DIAGNOSIS — R42 LIGHTHEADEDNESS: ICD-10-CM

## 2024-05-08 DIAGNOSIS — Z12.39 BREAST SCREENING: ICD-10-CM

## 2024-05-08 DIAGNOSIS — Z12.31 ENCOUNTER FOR SCREENING MAMMOGRAM FOR MALIGNANT NEOPLASM OF BREAST: ICD-10-CM

## 2024-05-08 DIAGNOSIS — M85.80 OSTEOPENIA, UNSPECIFIED LOCATION: Primary | ICD-10-CM

## 2024-05-08 PROCEDURE — 93880 EXTRACRANIAL BILAT STUDY: CPT

## 2024-05-08 PROCEDURE — 77080 DXA BONE DENSITY AXIAL: CPT

## 2024-05-08 PROCEDURE — 93880 EXTRACRANIAL BILAT STUDY: CPT | Performed by: RADIOLOGY

## 2024-05-08 PROCEDURE — 77063 BREAST TOMOSYNTHESIS BI: CPT | Performed by: RADIOLOGY

## 2024-05-08 PROCEDURE — 77067 SCR MAMMO BI INCL CAD: CPT

## 2024-05-08 PROCEDURE — 77067 SCR MAMMO BI INCL CAD: CPT | Performed by: RADIOLOGY

## 2024-05-08 RX ORDER — ALENDRONATE SODIUM 70 MG/1
70 TABLET ORAL
Qty: 12 TABLET | Refills: 3 | Status: SHIPPED | OUTPATIENT
Start: 2024-05-08 | End: 2025-05-08

## 2024-05-21 ENCOUNTER — HOSPITAL ENCOUNTER (OUTPATIENT)
Dept: RADIOLOGY | Facility: HOSPITAL | Age: 74
Discharge: HOME | End: 2024-05-21
Payer: MEDICARE

## 2024-05-21 DIAGNOSIS — G44.201 TENSION-TYPE HEADACHE, UNSPECIFIED, INTRACTABLE: ICD-10-CM

## 2024-05-21 DIAGNOSIS — R41.3 OTHER AMNESIA: ICD-10-CM

## 2024-05-21 PROCEDURE — 70547 MR ANGIOGRAPHY NECK W/O DYE: CPT | Performed by: RADIOLOGY

## 2024-05-21 PROCEDURE — 70547 MR ANGIOGRAPHY NECK W/O DYE: CPT

## 2024-05-21 PROCEDURE — 70551 MRI BRAIN STEM W/O DYE: CPT

## 2024-05-21 PROCEDURE — 70551 MRI BRAIN STEM W/O DYE: CPT | Performed by: RADIOLOGY

## 2024-05-21 PROCEDURE — 70544 MR ANGIOGRAPHY HEAD W/O DYE: CPT | Mod: 59

## 2024-05-22 ENCOUNTER — HOSPITAL ENCOUNTER (OUTPATIENT)
Dept: RADIOLOGY | Facility: HOSPITAL | Age: 74
Discharge: HOME | End: 2024-05-22
Payer: MEDICARE

## 2024-05-22 ENCOUNTER — OFFICE VISIT (OUTPATIENT)
Dept: PRIMARY CARE | Facility: CLINIC | Age: 74
End: 2024-05-22
Payer: MEDICARE

## 2024-05-22 VITALS
HEART RATE: 87 BPM | WEIGHT: 160.2 LBS | BODY MASS INDEX: 27.35 KG/M2 | DIASTOLIC BLOOD PRESSURE: 86 MMHG | OXYGEN SATURATION: 98 % | TEMPERATURE: 97.9 F | HEIGHT: 64 IN | RESPIRATION RATE: 18 BRPM | SYSTOLIC BLOOD PRESSURE: 120 MMHG

## 2024-05-22 DIAGNOSIS — E11.65 TYPE 2 DIABETES MELLITUS WITH HYPERGLYCEMIA, WITHOUT LONG-TERM CURRENT USE OF INSULIN (MULTI): Primary | ICD-10-CM

## 2024-05-22 DIAGNOSIS — R53.83 OTHER FATIGUE: ICD-10-CM

## 2024-05-22 DIAGNOSIS — R91.8 LUNG NODULES: ICD-10-CM

## 2024-05-22 DIAGNOSIS — E78.2 MIXED HYPERLIPIDEMIA: ICD-10-CM

## 2024-05-22 DIAGNOSIS — F17.200 TOBACCO DEPENDENCE: ICD-10-CM

## 2024-05-22 PROCEDURE — 71250 CT THORAX DX C-: CPT

## 2024-05-22 PROCEDURE — 99214 OFFICE O/P EST MOD 30 MIN: CPT | Performed by: NURSE PRACTITIONER

## 2024-05-22 PROCEDURE — 3074F SYST BP LT 130 MM HG: CPT | Performed by: NURSE PRACTITIONER

## 2024-05-22 PROCEDURE — 1160F RVW MEDS BY RX/DR IN RCRD: CPT | Performed by: NURSE PRACTITIONER

## 2024-05-22 PROCEDURE — 4010F ACE/ARB THERAPY RXD/TAKEN: CPT | Performed by: NURSE PRACTITIONER

## 2024-05-22 PROCEDURE — 76706 US ABDL AORTA SCREEN AAA: CPT

## 2024-05-22 PROCEDURE — 76706 US ABDL AORTA SCREEN AAA: CPT | Performed by: RADIOLOGY

## 2024-05-22 PROCEDURE — 1126F AMNT PAIN NOTED NONE PRSNT: CPT | Performed by: NURSE PRACTITIONER

## 2024-05-22 PROCEDURE — 1159F MED LIST DOCD IN RCRD: CPT | Performed by: NURSE PRACTITIONER

## 2024-05-22 PROCEDURE — 3049F LDL-C 100-129 MG/DL: CPT | Performed by: NURSE PRACTITIONER

## 2024-05-22 PROCEDURE — 3061F NEG MICROALBUMINURIA REV: CPT | Performed by: NURSE PRACTITIONER

## 2024-05-22 PROCEDURE — 3079F DIAST BP 80-89 MM HG: CPT | Performed by: NURSE PRACTITIONER

## 2024-05-22 PROCEDURE — 71250 CT THORAX DX C-: CPT | Performed by: STUDENT IN AN ORGANIZED HEALTH CARE EDUCATION/TRAINING PROGRAM

## 2024-05-22 RX ORDER — ROSUVASTATIN CALCIUM 40 MG/1
40 TABLET, COATED ORAL DAILY
Qty: 100 TABLET | Refills: 3 | Status: SHIPPED | OUTPATIENT
Start: 2024-05-22 | End: 2025-06-26

## 2024-05-22 RX ORDER — BLOOD SUGAR DIAGNOSTIC
STRIP MISCELLANEOUS
Qty: 50 EACH | Refills: 5 | Status: SHIPPED | OUTPATIENT
Start: 2024-05-22

## 2024-05-22 RX ORDER — DEXTROSE 4 G
TABLET,CHEWABLE ORAL
Qty: 1 EACH | Refills: 0 | Status: SHIPPED | OUTPATIENT
Start: 2024-05-22

## 2024-05-22 RX ORDER — BLOOD-GLUCOSE CONTROL, NORMAL
EACH MISCELLANEOUS
Qty: 50 EACH | Refills: 5 | Status: SHIPPED | OUTPATIENT
Start: 2024-05-22

## 2024-05-22 RX ORDER — DICYCLOMINE HYDROCHLORIDE 10 MG/1
CAPSULE ORAL
COMMUNITY
Start: 2024-04-24

## 2024-05-22 ASSESSMENT — LIFESTYLE VARIABLES
AUDIT-C TOTAL SCORE: 0
HOW OFTEN DO YOU HAVE A DRINK CONTAINING ALCOHOL: NEVER
SKIP TO QUESTIONS 9-10: 1
HOW OFTEN DO YOU HAVE SIX OR MORE DRINKS ON ONE OCCASION: NEVER
HOW MANY STANDARD DRINKS CONTAINING ALCOHOL DO YOU HAVE ON A TYPICAL DAY: PATIENT DOES NOT DRINK

## 2024-05-22 ASSESSMENT — ENCOUNTER SYMPTOMS
SLEEP DISTURBANCE: 1
TREMORS: 1
FATIGUE: 1

## 2024-05-22 ASSESSMENT — PATIENT HEALTH QUESTIONNAIRE - PHQ9
2. FEELING DOWN, DEPRESSED OR HOPELESS: NOT AT ALL
1. LITTLE INTEREST OR PLEASURE IN DOING THINGS: NOT AT ALL
SUM OF ALL RESPONSES TO PHQ9 QUESTIONS 1 AND 2: 0

## 2024-05-22 ASSESSMENT — PAIN SCALES - GENERAL: PAINLEVEL: 0-NO PAIN

## 2024-05-22 NOTE — PATIENT INSTRUCTIONS
Take junuvia 50 mg  check blood sugars will stop Lipitor and will start Crestor, 20 reviewed work up from  neuro cardiac and Opth. Discussed labs's will change med's will take blood sugar readings   with her today, return in 6 weeks call if not able to tolerate changes

## 2024-05-22 NOTE — PROGRESS NOTES
"Subjective   Patient ID: Vreonica Rice is a 73 y.o. female who presents for Follow-up (PT IS HERE FOR A 6 WEEK FOLLOW UP ).    Has been in hospital dealing with lots of issues  chol med's increased chol med's to 40 mg , has taken jaunuvia did not fell well here with .         Review of Systems   Constitutional:  Positive for fatigue.   Neurological:  Positive for tremors.   Psychiatric/Behavioral:  Positive for sleep disturbance.        Objective   /86   Pulse 87   Temp 36.6 °C (97.9 °F)   Resp 18   Ht 1.626 m (5' 4\")   Wt 72.7 kg (160 lb 3.2 oz)   LMP  (LMP Unknown)   SpO2 98%   BMI 27.50 kg/m²     Physical Exam  Vitals and nursing note reviewed.   Constitutional:       General: She is not in acute distress.     Appearance: Normal appearance. She is normal weight.   HENT:      Right Ear: Tympanic membrane and ear canal normal.      Left Ear: Tympanic membrane and ear canal normal.      Nose: Nose normal. No rhinorrhea.      Mouth/Throat:      Mouth: Mucous membranes are moist.      Pharynx: Oropharynx is clear. No oropharyngeal exudate or posterior oropharyngeal erythema.      Comments: Dentition wnl  Eyes:      Extraocular Movements: Extraocular movements intact.      Conjunctiva/sclera: Conjunctivae normal.      Pupils: Pupils are equal, round, and reactive to light.   Neck:      Vascular: No carotid bruit.   Cardiovascular:      Rate and Rhythm: Normal rate and regular rhythm.      Pulses: Normal pulses.      Heart sounds: Normal heart sounds. No murmur heard.  Pulmonary:      Effort: Pulmonary effort is normal.      Breath sounds: Normal breath sounds. No wheezing or rhonchi.   Abdominal:      General: Bowel sounds are normal. There is no distension.      Palpations: Abdomen is soft. There is no mass.      Tenderness: There is no abdominal tenderness. There is no guarding or rebound.      Hernia: No hernia is present.   Musculoskeletal:         General: No swelling or tenderness. Normal " range of motion.      Cervical back: Normal range of motion and neck supple.   Lymphadenopathy:      Cervical: No cervical adenopathy.   Skin:     General: Skin is warm.      Findings: No rash.   Neurological:      General: No focal deficit present.      Mental Status: She is alert.   Psychiatric:         Behavior: Behavior normal.         Assessment/Plan   Problem List Items Addressed This Visit             ICD-10-CM    Diabetes mellitus, type 2 (Multi) - Primary E11.9    Relevant Medications    blood-glucose meter (OneTouch Ultra2 Meter) misc    blood sugar diagnostic (OneTouch Ultra Test) strip    lancets (OneTouch UltraSoft 2 Lancet) 30 gauge misc    SITagliptin phosphate (Januvia) 50 mg tablet    Hyperlipidemia E78.5    Relevant Medications    rosuvastatin (Crestor) 40 mg tablet     Other Visit Diagnoses         Codes    Other fatigue     R53.83

## 2024-05-28 ENCOUNTER — TELEPHONE (OUTPATIENT)
Dept: PRIMARY CARE | Facility: CLINIC | Age: 74
End: 2024-05-28
Payer: MEDICARE

## 2024-05-28 NOTE — PROGRESS NOTES
"Assessment and Plan    05/21/2024 MRI brain without contrast & MRA head & neck, which I personally reviewed, show bihemispheric presumed ischemic FLAIR changes.  05/08/2023 ultrasound duplex carotid arteries, by report, shows <50% stenosis bilaterally.      08/19/2016 CT head without contrast, which I personally reviewed previously, shows no acute lesion.  12/16/2010 MRI brain with contrast, which I personally reviewed previously, shows few scattered bihemispheric white matter FLAIR lesions.  Prior head imaging.    04/29/2024 pathology \"A. Temporal artery, right, biopsy:  - Negative for arteritis.  See note     Note:  Several H&E levels were examined.  No evidence of active or healed arteritis is seen.  Clinical correlation is recommended.\"    Lab Results   Component Value Date/Time    SEDRATE 12 04/19/2024 1341    SEDRATE 15 01/08/2018 1205    CRP 0.29 04/19/2024 1341      04/17/2024 lipid panel with . HbA1c 7.7%.    05/01/2024 OCT RNFL  & .    05/01/2024 HVF 24-2 OD fovea 37, wnl MD +0.26 & OS fovea 35, wnl MD -0.26.    This 73 year-old woman with a history of DM2, HTN, HLD, smoking presents in follow up for evaluation of headaches with possible giant cell arteritis.    Her transient vision loss episodes in the past are consistent with transient ischemia. Eye examination shows several abnormalities in the right eye concerning for ischemia more related to diabetes without significant concern for giant cell arteritis. Carotid stenosis or dissection was a major concern was at the top of the differential but now with carotid imaging without significant stenosis. I recommend secondary vasculopathic risk reduction.    Plan    Decrease aspirin from 325 mg PO Qday to 81mg qday   Treat HTN & DM2 & HLD   Smoking cessation  See Retina specialist for dot blot hemes   Stroke precautions with immediate trip to emergency department for concerning symptoms of stroke.    Follow up neuro-ophthalmology PRN  " (Dilated 5/1/2024)

## 2024-05-29 ENCOUNTER — TELEPHONE (OUTPATIENT)
Dept: CARE COORDINATION | Facility: CLINIC | Age: 74
End: 2024-05-29

## 2024-05-29 ENCOUNTER — OFFICE VISIT (OUTPATIENT)
Dept: OPHTHALMOLOGY | Facility: CLINIC | Age: 74
End: 2024-05-29
Payer: MEDICARE

## 2024-05-29 DIAGNOSIS — H53.121 TRANSIENT VISUAL LOSS OF RIGHT EYE: Primary | ICD-10-CM

## 2024-05-29 PROCEDURE — 99214 OFFICE O/P EST MOD 30 MIN: CPT | Performed by: PSYCHIATRY & NEUROLOGY

## 2024-05-29 ASSESSMENT — CONF VISUAL FIELD
OS_NORMAL: 1
OS_SUPERIOR_TEMPORAL_RESTRICTION: 0
OS_INFERIOR_NASAL_RESTRICTION: 0
OD_SUPERIOR_TEMPORAL_RESTRICTION: 0
OD_INFERIOR_TEMPORAL_RESTRICTION: 0
OS_SUPERIOR_NASAL_RESTRICTION: 0
OD_INFERIOR_NASAL_RESTRICTION: 0
OS_INFERIOR_TEMPORAL_RESTRICTION: 0
METHOD: COUNTING FINGERS
OD_NORMAL: 1
OD_SUPERIOR_NASAL_RESTRICTION: 0

## 2024-05-29 ASSESSMENT — ENCOUNTER SYMPTOMS
RESPIRATORY NEGATIVE: 0
ALLERGIC/IMMUNOLOGIC NEGATIVE: 0
HEMATOLOGIC/LYMPHATIC NEGATIVE: 0
CONSTITUTIONAL NEGATIVE: 0
MUSCULOSKELETAL NEGATIVE: 0
GASTROINTESTINAL NEGATIVE: 0
NEUROLOGICAL NEGATIVE: 0
CARDIOVASCULAR NEGATIVE: 0
ENDOCRINE NEGATIVE: 0
EYES NEGATIVE: 1
PSYCHIATRIC NEGATIVE: 0

## 2024-05-29 ASSESSMENT — CUP TO DISC RATIO
OD_RATIO: .2
OS_RATIO: 0.2

## 2024-05-29 ASSESSMENT — VISUAL ACUITY
METHOD: SNELLEN - LINEAR
OD_SC: 20/30
OS_SC: 20/25-1
OS_PH_SC: 20/20
OD_PH_SC: 20/25

## 2024-05-29 ASSESSMENT — TONOMETRY
OS_IOP_MMHG: 10
OD_IOP_MMHG: 10
IOP_METHOD: GOLDMANN APPLANATION

## 2024-05-29 ASSESSMENT — EXTERNAL EXAM - LEFT EYE: OS_EXAM: NORMAL

## 2024-05-29 ASSESSMENT — SLIT LAMP EXAM - LIDS
COMMENTS: NORMAL
COMMENTS: NORMAL

## 2024-05-29 NOTE — TELEPHONE ENCOUNTER
Spoke with pt and . Pt had concern about elevated wbc count from another provider. Pt also reports was on 70mg of steroids x 1 month. I explained that this can elevated wbc and offered to have pk call when back in office. They declined, state they have pk number and will text. Pk notified

## 2024-05-29 NOTE — PROGRESS NOTES
Patient is scheduled for initial diabetes self management education on 6/11/24 at 10:00 AM in the [facility] Diabetes Education Room ([address]).

## 2024-05-29 NOTE — LETTER
"May 29, 2024     Tammie Ramirez MD  [address] [facility] [id number]    Patient: Veronica Rice   YOB: 1950   Date of Visit: 5/29/2024     Dear Dr. Tammie Ramirez MD:    I am writing to share my findings regarding our shared patient Veronica Rice from her visit with me on 5/29/2024.    HPI    This 73 year-old woman with a history of DM2, HTN, HLD, smoking presents in follow up for evaluation of headaches and episodes of transient vision loss.     ==Resident History Below===  She was seen in the ED on 05/06/2024 for fatigue and weakness, thought to be due to prednisone withdrawal. Her prednisone taper was extended. Now has been off prednisone since 5/19 and doing better.     Saw cardiologist Dr. Cy Keller 05/28/2029 who planned echocardiogram and stress test. Per patient and , he stated there was no cardiac-related reason to be on aspirin.     Currently with no headaches or vision changes, no more episodes of vision dimming. Last episode of vision dimming was about 1 month prior.     Still smoking 1/2 pack a day.     Reports her daughter had a stroke at age 48. Never found out a reason why, no hypercoagulability disorders     No GCA symptoms including headache, jaw claudication, vision changes.       Last edited by Marycruz Bravo MD on 5/29/2024  4:18 PM.        Diagnoses    There are no diagnoses linked to this encounter.  Assessment and Plan    05/21/2024 MRI brain without contrast & MRA head & neck, which I personally reviewed, show bihemispheric presumed ischemic FLAIR changes.  05/08/2023 ultrasound duplex carotid arteries, by report, shows <50% stenosis bilaterally.      08/19/2016 CT head without contrast, which I personally reviewed previously, shows no acute lesion.  12/16/2010 MRI brain with contrast, which I personally reviewed previously, shows few scattered bihemispheric white matter FLAIR lesions.  Prior head imaging.    04/29/2024 pathology \"A. " "Temporal artery, right, biopsy:  - Negative for arteritis.  See note     Note:  Several H&E levels were examined.  No evidence of active or healed arteritis is seen.  Clinical correlation is recommended.\"    Lab Results   Component Value Date/Time    SEDRATE 12 04/19/2024 1341    SEDRATE 15 01/08/2018 1205    CRP 0.29 04/19/2024 1341      04/17/2024 lipid panel with . HbA1c 7.7%.    05/01/2024 OCT RNFL  & .    05/01/2024 HVF 24-2 OD fovea 37, wnl MD +0.26 & OS fovea 35, wnl MD -0.26.    This 73 year-old woman with a history of DM2, HTN, HLD, smoking presents in follow up for evaluation of headaches with possible giant cell arteritis.    Her transient vision loss episodes in the past are consistent with transient ischemia. Eye examination shows several abnormalities in the right eye concerning for ischemia more related to diabetes without significant concern for giant cell arteritis. Carotid stenosis or dissection was a major concern was at the top of the differential but now with carotid imaging without significant stenosis. I recommend secondary vasculopathic risk reduction.    Plan    Decrease aspirin from 325 mg PO Qday to 81mg qday   Treat HTN & DM2 & HLD   Smoking cessation  See Retina specialist for dot blot hemes   Stroke precautions with immediate trip to emergency department for concerning symptoms of stroke.    Follow up neuro-ophthalmology PRN  (Dilated 5/1/2024)      Below you will find my full examination. I appreciate the opportunity to see Veronica Rice today and to share in her care with you. Please contact me if you have questions for me regarding this visit or if I can be of assistance to another of your patients with neuro-ophthalmological problems.    Sincerely,    Salbador Hanson MD PhD    CC:   Cy Keller MD      Base Eye Exam       Visual Acuity (Snellen - Linear)         Right Left    Dist sc 20/30 20/25-1    Dist ph sc 20/25 20/20              Tonometry (Goldmann " Applanation, 3:13 PM)         Right Left    Pressure 10 10              Pupils         Dark Light Shape React APD    Right 5 3 Round Brisk None    Left 5 3 Round Brisk None              Visual Fields (Counting fingers)         Left Right     Full Full              Extraocular Movement         Right Left     Full, Ortho Full, Ortho              Neuro/Psych       Oriented x3: Yes    Mood/Affect: Normal                  Additional Tests       Color         Right Left    Ishihara 11 11                  Slit Lamp and Fundus Exam       External Exam         Right Left    External Right temporal tenderness to palpation Normal              Slit Lamp Exam         Right Left    Lids/Lashes Normal Normal    Conjunctiva/Sclera White and quiet White and quiet    Cornea 1+ inferior SPK 2+ inferior SPK    Anterior Chamber Deep and quiet Deep and quiet    Iris Round and reactive Round and reactive    Lens multifocal IOL s/p YAG multifocal IOL s/p YAG    Anterior Vitreous Normal PVD              Fundus Exam         Right Left    Disc Normal Normal    C/D Ratio .2 0.2    Macula Microaneurysms and DBHs Normal    Vessels AV nicking, mild tortuousity AV nicking, mild tortuosity    Periphery SN and IT blot hemes Normal

## 2024-06-11 ENCOUNTER — CLINICAL SUPPORT (OUTPATIENT)
Dept: CARE COORDINATION | Facility: CLINIC | Age: 74
End: 2024-06-11
Payer: MEDICARE

## 2024-06-14 NOTE — PROGRESS NOTES
"Reason for Visit:  Veronica Rice is a 73 y.o. female who presents for Initial DSME Visit    DSME - Global Assessment    Referring Provider: Essie Russ  Marital Status: .  Support Person: Name: Carlos Rice () .    What do you hope to gain from this diabetes education visit? \"I need to know what to eat\"    Have you had diabetes education in the past?  No.  In Your words, what is Diabetes: \"I have no idea\"  What Concerns you most about having diabetes: \"I really like to eat, especially sweets.\"    Readiness to Learn: demonstrates willingness to learn and demonstrates ability to learn  Preferred learning method: \"repetition\"    Household Composition: living independently, with family    Demographics:   Difficulties with: None    Race/Ethnic Origin: White/  Occupation:  Patient indicates that she retired about 7 years ago.    Health Status:  Smoking/Tobacco Use: Yes, patient does smoke or use tobacco.  Frequency: Patient reports that she has been a smoker for about 60 years. Currently admits to smoking about 10 cigarettes daily.  Alcohol Use: No, patient does not drink alcohol.    Type of Diabetes: Type 2  What year were you diagnosed with diabetes: [id number]  Family History: Patient reports that her Mother was diagnosed with Diabetes later in life and believes it was related to the Prednisone she had to take due to her COPD.    Comorbidities/Chronic Complications: hypertension and hyperlipidemia    Lab Results   Component Value Date    HGBA1C 7.7 (A) 04/17/2024    HGBA1C 6.8 (A) 10/31/2023    HGBA1C 6.8 (H) 10/18/2018     Lab Results   Component Value Date    CHOL 193 04/17/2024    CHOL 188 10/31/2023    CHOL 189 04/20/2023     Lab Results   Component Value Date    HDL 37.0 (L) 04/17/2024    HDL 39.0 (L) 10/31/2023    HDL 39 (L) 04/20/2023     Lab Results   Component Value Date    LDLCALC 111 04/17/2024    LDLCALC 109 10/31/2023    LDLCALC 114 04/20/2023     Lab Results   Component Value Date    " TRIG 227 (H) 04/17/2024    TRIG 198 (H) 10/31/2023    TRIG 178 (H) 04/20/2023     Lab Results   Component Value Date    MICHELA 12 04/17/2023       Health Utilization Past 12 Months:   Hospital Admissions: No.  ER Visits: Yes. Number of Visits: Patient had an ED visit on 4/19/24 due to increased ocular pressure, and again on 5/6/24 due to extreme fatigue thought to be related to too rapid of a steroid reduction.  Primary Care Visits: Yes. Number of Visits: Last PCP visit noted to be on 5/22/24.  Last Eye Exam : Patient was seen by Ophthalmology on 5/29/24.  Podiatry : Patient does not see Podiatry but was encouraged to look at her feet daily for breaks in the skin.  Dentist : Patient reports that she has not seen by a Dentist in over the past 12 months.    Diabetes Self-Management Skills and Behaviors:   Do you exercise regularly?: No.  Physical Activity : Patient reports she works outside in the yard/garden a lot.  Yes      Diabetes Medications: oral agents  Current Outpatient Medications   Medication Sig Dispense Refill    acetaminophen (TylenoL) 325 mg tablet Take by mouth.      alendronate (Fosamax) 70 mg tablet Take 1 tablet (70 mg) by mouth every 7 days. Take in the morning with a full glass of water, on an empty stomach, and do not take anything else by mouth or lie down for the next 30 min. 12 tablet 3    aspirin 325 mg EC tablet Take 1 tablet (325 mg) by mouth once daily. 30 tablet 11    blood sugar diagnostic (OneTouch Ultra Test) strip Use to test glucose daily as directed 50 each 5    blood-glucose meter (OneTouch Ultra2 Meter) misc Use to test glucose as directed 1 each 0    dicyclomine (Bentyl) 10 mg capsule 1 cap(s) orally EVERY BEFORE MEALS AS NEEDED 30 day(s)      escitalopram (Lexapro) 20 mg tablet Take 1 tablet (20 mg) by mouth once daily. 30 tablet 5    lancets (OneTouch UltraSoft 2 Lancet) 30 gauge misc Use to test glucose daily as directed 50 each 5    lisinopril 5 mg tablet Take 1 tablet (5 mg)  "by mouth once daily. 100 tablet 3    rosuvastatin (Crestor) 40 mg tablet Take 1 tablet (40 mg) by mouth once daily. 100 tablet 3    SITagliptin phosphate (Januvia) 50 mg tablet Take 1 tablet (50 mg) by mouth once daily. 30 tablet 2     No current facility-administered medications for this visit.       Hypoglycemia: Patient does report having extreme fatigue daily and believes it to be related to low blood sugar, however, she is not sure.  Hypoglycemia Treatment: \"I eat something\"      Diabetes Assessment:   My level of stress is high: agree.  Most difficult part of managing DM: \"knowing what I can eat and what I can't\"    DSME - Meal Planning and Diet Recall  Are you currently following any meal plan:  Patient reports that since her last PCP visit she has really tried to decrease her consumption of Laura Bars, ice cream and orange juice .    Does your culture or Holiness require any of the following:  None currently reported.  Who does the grocery shopping? spouse  Who does the cooking in the home? patient and spouse    How often do you eat out? Patient reports eating out about 7x/week (usually dinner)  How many meals do you eat in per day: three.  Which meals do you tend to skip: lunch  What do you drink with and between meals: water, coffee, juice, and sugary drinks    Diet Recall:   Diet history was obtained. Patient reports \"I really like my sweets\". Breakfast meal typically consists of two slices of toast with regular jelly or Special K cereal with additional berries added and a whole banana. Lunch can include 1/2 sandwich. Dinner meal is typically eaten out. Current water consumption is reported to be about 40 oz daily.    DSME - Goals and Recommendations    [facility] Diabetes Education Program SMART Behavior Goal Setting:        S - Specific: Exactly what do you want to do        M - Measurable: Use a calendar or chart to track progress        A - Attainable: Take small steps to make bigger " changes        R - Realistic: Pick something reasonable that you know you can do        T - Time Oriented: Choose a time limit (No longer than 6 months)    Specific Goal:   I will check my blood sugar 1x.day at alternating times (fasting in AM or two hours after the start of a meal), record levels and take a log book to doctors visits.    Measurable: How will I track my goal:  I will keep track of my progress daily by using a blood sugar log .    Time Oriented: one week.    Topics Covered and Impression:    DSME Topics Covered During Visit:   A1c Review  Understanding Diabetes Basics  Reviewed Chronic Complications/Risks Related to Diabetes  Routine Health Assessment and Labs  Reviewing Medication Classes  Taking Medications as Prescribed  Using a Blood Sugar Monitor  Using BG Logbook  Review Glycemic Goals (CGM or SMBG)      DSME Topics for Follow-Up:   Being Physically Active  Reviewed Hypoglycemia Signs/Symptoms/Tx Plan  Reviewed Hyperglycemia Signs/Symptoms/Tx Plan  Ways to Deal With Diabetes Symptoms  Glycemic Pattern Management  Healthy Meal Plan  MyPlate Method    Materials provided during today's visit: Patient was provided with Diabetes Education Handout, HgbA1c vs. Average blood sugar conversion sheet, and new lancing device.    Provider Impression: Patient was seen, in person, in the [facility] Diabetes Education Room ([address]). Patients' spouse (Carlos) was also present throughout the duration of our session. Patient confirmed the current diabetes medication regimen: Januvia 50 mg 1-tab daily. Patient indicated compliance in taking this medication, as ordered, and offered no significant, negative side affects with its continued use. Patient presented to appointment with unopened new glucometer. Patient was instructed on glucometer use and the proper disposal of used supplies. Patient appeared extremely anxious throughout this process but was eventually able to successfully obtain a blood sugar value of  191 mg/dl about three hours after her breakfast meal. Breakfast was reported as two slices of toast with regular jelly and coffee with cream and 1 tsp of regular sugar. Review of record indicates that patient had been on Prednisone taper from 5/7/24 - 5/20/24. Patient appeared engaged throughout session but will likely need review of material next session. The following goal was established: 1.) patient will begin checking her blood sugar value 1x/day at alternating times (fasting in AM or two hours postprandial) and will record the results. Full review of education handout was not completed given time constraints. As such, patient has agreed to a follow up appointment on 6/18/24 at 10:30 AM in the [facility] Diabetes Education Room ([address]), for this purpose.     Time: I personally spent a total of 105 minutes with the patient providing diabetes self-management education. Visit documentation will be sent electronically to referring provider.

## 2024-06-18 ENCOUNTER — APPOINTMENT (OUTPATIENT)
Dept: CARE COORDINATION | Facility: CLINIC | Age: 74
End: 2024-06-18
Payer: MEDICARE

## 2024-07-12 ENCOUNTER — LAB (OUTPATIENT)
Dept: LAB | Facility: LAB | Age: 74
End: 2024-07-12
Payer: MEDICARE

## 2024-07-12 DIAGNOSIS — I10 HYPERTENSION, UNSPECIFIED TYPE: ICD-10-CM

## 2024-07-12 LAB
ALT SERPL W P-5'-P-CCNC: 17 U/L (ref 7–45)
AST SERPL W P-5'-P-CCNC: 21 U/L (ref 9–39)
CHOLEST SERPL-MCNC: 100 MG/DL (ref 0–199)
CHOLESTEROL/HDL RATIO: 2.4
HDLC SERPL-MCNC: 41.7 MG/DL
LDLC SERPL CALC-MCNC: 39 MG/DL
NON HDL CHOLESTEROL: 58 MG/DL (ref 0–149)
TRIGL SERPL-MCNC: 96 MG/DL (ref 0–149)
VLDL: 19 MG/DL (ref 0–40)

## 2024-07-12 PROCEDURE — 84460 ALANINE AMINO (ALT) (SGPT): CPT

## 2024-07-12 PROCEDURE — 80061 LIPID PANEL: CPT

## 2024-07-12 PROCEDURE — 84450 TRANSFERASE (AST) (SGOT): CPT

## 2024-07-12 PROCEDURE — 36415 COLL VENOUS BLD VENIPUNCTURE: CPT

## 2024-07-22 DIAGNOSIS — M85.80 OSTEOPENIA, UNSPECIFIED LOCATION: ICD-10-CM

## 2024-07-22 DIAGNOSIS — E78.2 MIXED HYPERLIPIDEMIA: ICD-10-CM

## 2024-07-22 DIAGNOSIS — E11.65 TYPE 2 DIABETES MELLITUS WITH HYPERGLYCEMIA, WITHOUT LONG-TERM CURRENT USE OF INSULIN (MULTI): ICD-10-CM

## 2024-07-22 DIAGNOSIS — F32.A DEPRESSION, UNSPECIFIED DEPRESSION TYPE: ICD-10-CM

## 2024-07-24 ENCOUNTER — APPOINTMENT (OUTPATIENT)
Dept: CARE COORDINATION | Facility: CLINIC | Age: 74
End: 2024-07-24
Payer: MEDICARE

## 2024-07-24 ENCOUNTER — CLINICAL SUPPORT (OUTPATIENT)
Dept: SLEEP MEDICINE | Facility: HOSPITAL | Age: 74
End: 2024-07-24
Payer: MEDICARE

## 2024-07-24 ENCOUNTER — OFFICE VISIT (OUTPATIENT)
Dept: PRIMARY CARE | Facility: CLINIC | Age: 74
End: 2024-07-24
Payer: MEDICARE

## 2024-07-24 VITALS
TEMPERATURE: 97.5 F | SYSTOLIC BLOOD PRESSURE: 128 MMHG | DIASTOLIC BLOOD PRESSURE: 74 MMHG | OXYGEN SATURATION: 98 % | HEART RATE: 83 BPM | BODY MASS INDEX: 26.8 KG/M2 | HEIGHT: 64 IN | RESPIRATION RATE: 18 BRPM | WEIGHT: 157 LBS

## 2024-07-24 DIAGNOSIS — Z00.00 WELL ADULT EXAM: ICD-10-CM

## 2024-07-24 DIAGNOSIS — G47.33 OBSTRUCTIVE SLEEP APNEA (ADULT) (PEDIATRIC): ICD-10-CM

## 2024-07-24 DIAGNOSIS — E11.65 TYPE 2 DIABETES MELLITUS WITH HYPERGLYCEMIA, WITHOUT LONG-TERM CURRENT USE OF INSULIN (MULTI): ICD-10-CM

## 2024-07-24 DIAGNOSIS — I10 HYPERTENSION, UNSPECIFIED TYPE: ICD-10-CM

## 2024-07-24 DIAGNOSIS — E11.9 TYPE 2 DIABETES MELLITUS WITHOUT COMPLICATION, WITHOUT LONG-TERM CURRENT USE OF INSULIN (MULTI): Primary | ICD-10-CM

## 2024-07-24 DIAGNOSIS — E55.9 VITAMIN D DEFICIENCY: ICD-10-CM

## 2024-07-24 DIAGNOSIS — E78.2 MIXED HYPERLIPIDEMIA: ICD-10-CM

## 2024-07-24 DIAGNOSIS — R41.3 MEMORY CHANGES: ICD-10-CM

## 2024-07-24 DIAGNOSIS — F41.9 ANXIETY: ICD-10-CM

## 2024-07-24 PROCEDURE — 99213 OFFICE O/P EST LOW 20 MIN: CPT | Performed by: NURSE PRACTITIONER

## 2024-07-24 PROCEDURE — 4010F ACE/ARB THERAPY RXD/TAKEN: CPT | Performed by: NURSE PRACTITIONER

## 2024-07-24 PROCEDURE — 3048F LDL-C <100 MG/DL: CPT | Performed by: NURSE PRACTITIONER

## 2024-07-24 PROCEDURE — 3061F NEG MICROALBUMINURIA REV: CPT | Performed by: NURSE PRACTITIONER

## 2024-07-24 PROCEDURE — 3074F SYST BP LT 130 MM HG: CPT | Performed by: NURSE PRACTITIONER

## 2024-07-24 PROCEDURE — 99215 OFFICE O/P EST HI 40 MIN: CPT | Performed by: NURSE PRACTITIONER

## 2024-07-24 PROCEDURE — 83036 HEMOGLOBIN GLYCOSYLATED A1C: CPT | Mod: QW | Performed by: NURSE PRACTITIONER

## 2024-07-24 PROCEDURE — 1126F AMNT PAIN NOTED NONE PRSNT: CPT | Performed by: NURSE PRACTITIONER

## 2024-07-24 PROCEDURE — 3078F DIAST BP <80 MM HG: CPT | Performed by: NURSE PRACTITIONER

## 2024-07-24 PROCEDURE — 1170F FXNL STATUS ASSESSED: CPT | Performed by: NURSE PRACTITIONER

## 2024-07-24 PROCEDURE — G0439 PPPS, SUBSEQ VISIT: HCPCS | Performed by: NURSE PRACTITIONER

## 2024-07-24 PROCEDURE — 1159F MED LIST DOCD IN RCRD: CPT | Performed by: NURSE PRACTITIONER

## 2024-07-24 RX ORDER — ROSUVASTATIN CALCIUM 40 MG/1
40 TABLET, COATED ORAL DAILY
Qty: 90 TABLET | Refills: 1 | Status: SHIPPED | OUTPATIENT
Start: 2024-07-24

## 2024-07-24 RX ORDER — LOTEPREDNOL ETABONATE 5 MG/ML
SUSPENSION/ DROPS OPHTHALMIC 4 TIMES DAILY
COMMUNITY

## 2024-07-24 RX ORDER — LISINOPRIL 5 MG/1
5 TABLET ORAL DAILY
Qty: 90 TABLET | Refills: 1 | Status: SHIPPED | OUTPATIENT
Start: 2024-07-24

## 2024-07-24 RX ORDER — BLOOD-GLUCOSE CONTROL, NORMAL
EACH MISCELLANEOUS
Qty: 100 EACH | Refills: 1 | Status: SHIPPED | OUTPATIENT
Start: 2024-07-24

## 2024-07-24 RX ORDER — MEMANTINE HYDROCHLORIDE 5 MG/1
5 TABLET ORAL 2 TIMES DAILY
COMMUNITY

## 2024-07-24 RX ORDER — BLOOD SUGAR DIAGNOSTIC
STRIP MISCELLANEOUS
Qty: 100 EACH | Refills: 1 | Status: SHIPPED | OUTPATIENT
Start: 2024-07-24

## 2024-07-24 RX ORDER — ERGOCALCIFEROL 1.25 MG/1
50000 CAPSULE ORAL
Qty: 12 CAPSULE | Refills: 3 | Status: SHIPPED | OUTPATIENT
Start: 2024-07-28 | End: 2025-06-29

## 2024-07-24 RX ORDER — ALENDRONATE SODIUM 70 MG/1
70 TABLET ORAL
Qty: 3 TABLET | Refills: 3 | Status: SHIPPED | OUTPATIENT
Start: 2024-07-28

## 2024-07-24 RX ORDER — ESCITALOPRAM OXALATE 20 MG/1
20 TABLET ORAL DAILY
Qty: 90 TABLET | Refills: 1 | Status: SHIPPED | OUTPATIENT
Start: 2024-07-24

## 2024-07-24 RX ORDER — ASPIRIN 81 MG/1
81 TABLET ORAL DAILY
COMMUNITY

## 2024-07-24 ASSESSMENT — ENCOUNTER SYMPTOMS
DEPRESSION: 0
LOSS OF SENSATION IN FEET: 0
OCCASIONAL FEELINGS OF UNSTEADINESS: 0

## 2024-07-24 ASSESSMENT — PAIN SCALES - GENERAL: PAINLEVEL: 0-NO PAIN

## 2024-07-24 ASSESSMENT — ACTIVITIES OF DAILY LIVING (ADL)
GROCERY_SHOPPING: INDEPENDENT
TAKING_MEDICATION: INDEPENDENT
BATHING: INDEPENDENT
DOING_HOUSEWORK: INDEPENDENT
MANAGING_FINANCES: NEEDS ASSISTANCE
DRESSING: INDEPENDENT

## 2024-07-24 ASSESSMENT — PATIENT HEALTH QUESTIONNAIRE - PHQ9
1. LITTLE INTEREST OR PLEASURE IN DOING THINGS: NOT AT ALL
SUM OF ALL RESPONSES TO PHQ9 QUESTIONS 1 AND 2: 0
2. FEELING DOWN, DEPRESSED OR HOPELESS: NOT AT ALL

## 2024-07-24 NOTE — PROGRESS NOTES
"Subjective   Patient ID: Veronica Rice is a 73 y.o. female who presents for Medicare Annual Wellness Visit Subsequent (Pt here for medicare wellness exam) and Diabetes (HA1C IS 6.8/RG).    Here for  well visit testing blood sugars saw dietician doing better seeing neuro and opthhaic 6,8. Here with , looks so much better and so glad feeling much better overall doing much better has seen all specialist    Diabetes  She presents for her follow-up diabetic visit. She has type 2 diabetes mellitus. Her disease course has been improving.   Eyes doing so much better      Review of Systems   Psychiatric/Behavioral:          Better       Objective   /74   Pulse 83   Temp 36.4 °C (97.5 °F)   Resp 18   Ht 1.626 m (5' 4\")   Wt 71.2 kg (157 lb)   LMP  (LMP Unknown)   SpO2 98%   BMI 26.95 kg/m²     Physical Exam  Vitals and nursing note reviewed.   Constitutional:       General: She is not in acute distress.  HENT:      Right Ear: Tympanic membrane and ear canal normal.      Left Ear: Tympanic membrane and ear canal normal.      Nose: Nose normal. No rhinorrhea.      Mouth/Throat:      Pharynx: Oropharynx is clear. No oropharyngeal exudate or posterior oropharyngeal erythema.      Comments: Dentition wnl  Eyes:      Extraocular Movements: Extraocular movements intact.      Conjunctiva/sclera: Conjunctivae normal.      Pupils: Pupils are equal, round, and reactive to light.   Neck:      Vascular: No carotid bruit.   Cardiovascular:      Rate and Rhythm: Normal rate and regular rhythm.      Heart sounds: Normal heart sounds. No murmur heard.  Pulmonary:      Breath sounds: Normal breath sounds. No wheezing or rhonchi.   Abdominal:      General: Bowel sounds are normal. There is no distension.      Palpations: Abdomen is soft. There is no mass.      Tenderness: There is no abdominal tenderness. There is no guarding or rebound.      Hernia: No hernia is present.   Genitourinary:     Comments: Breast exam " nl  Musculoskeletal:         General: No swelling or tenderness. Normal range of motion.      Cervical back: Normal range of motion and neck supple.   Feet:      Right foot:      Skin integrity: Skin integrity normal.      Toenail Condition: Right toenails are normal.      Left foot:      Skin integrity: Skin integrity normal.      Toenail Condition: Left toenails are normal.      Comments: Pulses ok  Lymphadenopathy:      Cervical: No cervical adenopathy.   Skin:     General: Skin is warm.      Findings: No rash.   Neurological:      General: No focal deficit present.      Mental Status: She is alert.         Assessment/Plan   Problem List Items Addressed This Visit             ICD-10-CM    Diabetes mellitus, type 2 (Multi) - Primary E11.9    Relevant Medications    blood sugar diagnostic (OneTouch Ultra Test) strip    lancets (OneTouch UltraSoft 2 Lancet) 30 gauge misc    Hyperlipidemia E78.5    Vitamin D deficiency E55.9    Relevant Medications    ergocalciferol (Vitamin D-2) 1.25 MG (56042 UT) capsule (Start on 7/28/2024)    Anxiety F41.9    HTN (hypertension) I10     Other Visit Diagnoses         Codes    Memory changes     R41.3        Doing so much better, dm much better controlled return in 3 months for dm check, refilled meds, call if needs anything, sees dr zhang feels memory better .

## 2024-07-25 VITALS — RESPIRATION RATE: 16 BRPM | HEART RATE: 76 BPM | OXYGEN SATURATION: 96 %

## 2024-07-25 LAB — POC HEMOGLOBIN A1C: 6.8 % (ref 4.2–6.5)

## 2024-07-25 ASSESSMENT — SLEEP AND FATIGUE QUESTIONNAIRES
ESS-CHAD TOTAL SCORE: 7
HOW LIKELY ARE YOU TO NOD OFF OR FALL ASLEEP WHILE SITTING QUIETLY AFTER LUNCH WITHOUT ALCOHOL: WOULD NEVER DOZE
SITING INACTIVE IN A PUBLIC PLACE LIKE A CLASS ROOM OR A MOVIE THEATER: SLIGHT CHANCE OF DOZING
HOW LIKELY ARE YOU TO NOD OFF OR FALL ASLEEP WHILE SITTING AND TALKING TO SOMEONE: WOULD NEVER DOZE
HOW LIKELY ARE YOU TO NOD OFF OR FALL ASLEEP WHILE LYING DOWN TO REST IN THE AFTERNOON WHEN CIRCUMSTANCES PERMIT: MODERATE CHANCE OF DOZING
HOW LIKELY ARE YOU TO NOD OFF OR FALL ASLEEP WHEN YOU ARE A PASSENGER IN A CAR FOR AN HOUR WITHOUT A BREAK: SLIGHT CHANCE OF DOZING
HOW LIKELY ARE YOU TO NOD OFF OR FALL ASLEEP WHILE WATCHING TV: MODERATE CHANCE OF DOZING
HOW LIKELY ARE YOU TO NOD OFF OR FALL ASLEEP WHILE SITTING AND READING: SLIGHT CHANCE OF DOZING
HOW LIKELY ARE YOU TO NOD OFF OR FALL ASLEEP IN A CAR, WHILE STOPPED FOR A FEW MINUTES IN TRAFFIC: WOULD NEVER DOZE

## 2024-07-25 NOTE — PROGRESS NOTES
[facility] TECH NOTE:     Patient: Veronica Rice   MRN//AGE: [id number]  1950  73 y.o.   Technologist: Rama Gutierrez RRT-SDS   Room: 1   Service Date: 2024        Sleep Testing Location: [facility]:     TECHNOLOGIST SLEEP STUDY PROCEDURE NOTE:   This sleep study is being conducted according to the policies and procedures outlined by the AAS accreditation standards.  The sleep study procedure and processes involved during this appointment was explained to the patient/patient’s family, questions were answered. The patient/family verbalized understanding.      The patient is a 73 y.o. year old female scheduled for a Diagnostic PSG . she arrived for her appointment.      The study that was ultimately completed was a Diagnostic PSG .    The full study Was completed.  Patient questionnaires completed?: yes     Consents signed? yes    Initial Fall Risk Screening:     Veronica has not fallen in the last 6 months. her did not result in injury. Veronica does not have a fear of falling. He does not need assistance with sitting, standing, or walking. she does not need assistance walking in her home. she does not need assistance in an unfamiliar setting. The patient is notusing an assistive device.     Brief Study observations: Patient came in for a PSG study tonight. Patient had Arousals of Respiratory, Spontaneous and Limb movements. Patients Respiratory events were Hypopneas and Flow limitations. Patient had intermittent, mild to moderate snoring. Patient was up 0 times throughout the night. Patient did go into REM at the end of the study.     Deviation to order/protocol and reason:       If PAP, which was preferred mask/pressure/mode:       Other:None    After the procedure, the patient/family was informed to ensure followup with ordering clinician for testing results.      Technologist: Rama Gutierrez RRT-SDS

## 2024-08-16 NOTE — PROGRESS NOTES
Met with patient on 7/24/24 from 1:00 PM - 1:30 PM for follow up diabetes self management education. Patient was seen, in person, in the [facility] Diabetes Education Room ([address]). Patients'  (Carlos) was also present throughout the duration of our session. Patient confirmed the continued use of the following diabetes medication regimen: Januvia 50 mg 1-tab daily. Patient indicated compliance in taking this medication, as ordered, and offered no significant, negative issues with its continued use. Since our last review, patient reports that she had achieved her goal of 15 minutes of walking at least 3x/week. In addition, patient reports she continues to do gardening on a regular basis. Dietary changes are currently reported as eating smaller portion sizes, less pasta and sweets, along with making a conscious effort to eat more protein. As a result, patient indicates that she feels better and is less fatigued.  continues to assist in checking his wife's blood sugar values daily. Current overall blood sugar average is reported at 136 mg/dl which is much improved over previously reported values. Repeat HgbA1c value of 6.8% was obtained on 7/24/24 and is indicative of a .9 % reduction in A1c value since last obtained. Congratulations was provided! Patient very pleased with progress and believes her current diet and exercise efforts are sustainable. As such, no additional follow up appointment was set at the time. Patient was provided with educator contact information and was encouraged to reach out should questions arise or if additional one-on-one session be requested.

## 2024-10-21 ENCOUNTER — OFFICE VISIT (OUTPATIENT)
Dept: PRIMARY CARE | Facility: CLINIC | Age: 74
End: 2024-10-21
Payer: MEDICARE

## 2024-10-21 VITALS
WEIGHT: 156.6 LBS | OXYGEN SATURATION: 97 % | RESPIRATION RATE: 18 BRPM | TEMPERATURE: 97.2 F | BODY MASS INDEX: 28.82 KG/M2 | SYSTOLIC BLOOD PRESSURE: 122 MMHG | HEIGHT: 62 IN | DIASTOLIC BLOOD PRESSURE: 72 MMHG | HEART RATE: 92 BPM

## 2024-10-21 DIAGNOSIS — R53.83 OTHER FATIGUE: ICD-10-CM

## 2024-10-21 DIAGNOSIS — I10 HYPERTENSION, UNSPECIFIED TYPE: ICD-10-CM

## 2024-10-21 DIAGNOSIS — G47.30 SLEEP APNEA, UNSPECIFIED TYPE: ICD-10-CM

## 2024-10-21 DIAGNOSIS — E78.2 MIXED HYPERLIPIDEMIA: ICD-10-CM

## 2024-10-21 DIAGNOSIS — F17.200 TOBACCO DEPENDENCE: ICD-10-CM

## 2024-10-21 DIAGNOSIS — E11.9 TYPE 2 DIABETES MELLITUS WITHOUT COMPLICATION, WITHOUT LONG-TERM CURRENT USE OF INSULIN (MULTI): ICD-10-CM

## 2024-10-21 DIAGNOSIS — E11.65 TYPE 2 DIABETES MELLITUS WITH HYPERGLYCEMIA, WITHOUT LONG-TERM CURRENT USE OF INSULIN: Primary | ICD-10-CM

## 2024-10-21 DIAGNOSIS — Z23 ENCOUNTER FOR IMMUNIZATION: ICD-10-CM

## 2024-10-21 LAB
ALBUMIN SERPL BCP-MCNC: 4.1 G/DL (ref 3.4–5)
ALP SERPL-CCNC: 65 U/L (ref 33–136)
ALT SERPL W P-5'-P-CCNC: 22 U/L (ref 7–45)
ANION GAP SERPL CALCULATED.3IONS-SCNC: 8 MMOL/L (ref 10–20)
AST SERPL W P-5'-P-CCNC: 24 U/L (ref 9–39)
BILIRUB SERPL-MCNC: 0.3 MG/DL (ref 0–1.2)
BUN SERPL-MCNC: 17 MG/DL (ref 6–23)
CALCIUM SERPL-MCNC: 9 MG/DL (ref 8.6–10.3)
CHLORIDE SERPL-SCNC: 107 MMOL/L (ref 98–107)
CO2 SERPL-SCNC: 28 MMOL/L (ref 21–32)
CREAT SERPL-MCNC: 0.83 MG/DL (ref 0.5–1.05)
CREAT UR-MCNC: 132.7 MG/DL (ref 20–320)
EGFRCR SERPLBLD CKD-EPI 2021: 74 ML/MIN/1.73M*2
GLUCOSE SERPL-MCNC: 117 MG/DL (ref 74–99)
MICROALBUMIN UR-MCNC: <7 MG/L
MICROALBUMIN/CREAT UR: NORMAL MG/G{CREAT}
POC HEMOGLOBIN A1C: 7.1 % (ref 4.2–6.5)
POTASSIUM SERPL-SCNC: 4.4 MMOL/L (ref 3.5–5.3)
PROT SERPL-MCNC: 6.8 G/DL (ref 6.4–8.2)
SODIUM SERPL-SCNC: 139 MMOL/L (ref 136–145)

## 2024-10-21 PROCEDURE — 1159F MED LIST DOCD IN RCRD: CPT | Performed by: NURSE PRACTITIONER

## 2024-10-21 PROCEDURE — 1126F AMNT PAIN NOTED NONE PRSNT: CPT | Performed by: NURSE PRACTITIONER

## 2024-10-21 PROCEDURE — 3074F SYST BP LT 130 MM HG: CPT | Performed by: NURSE PRACTITIONER

## 2024-10-21 PROCEDURE — 4010F ACE/ARB THERAPY RXD/TAKEN: CPT | Performed by: NURSE PRACTITIONER

## 2024-10-21 PROCEDURE — 99214 OFFICE O/P EST MOD 30 MIN: CPT | Performed by: NURSE PRACTITIONER

## 2024-10-21 PROCEDURE — 80053 COMPREHEN METABOLIC PANEL: CPT | Performed by: NURSE PRACTITIONER

## 2024-10-21 PROCEDURE — 3048F LDL-C <100 MG/DL: CPT | Performed by: NURSE PRACTITIONER

## 2024-10-21 PROCEDURE — 3061F NEG MICROALBUMINURIA REV: CPT | Performed by: NURSE PRACTITIONER

## 2024-10-21 PROCEDURE — 3078F DIAST BP <80 MM HG: CPT | Performed by: NURSE PRACTITIONER

## 2024-10-21 PROCEDURE — 1158F ADVNC CARE PLAN TLK DOCD: CPT | Performed by: NURSE PRACTITIONER

## 2024-10-21 PROCEDURE — 36415 COLL VENOUS BLD VENIPUNCTURE: CPT | Performed by: NURSE PRACTITIONER

## 2024-10-21 PROCEDURE — 82043 UR ALBUMIN QUANTITATIVE: CPT | Performed by: NURSE PRACTITIONER

## 2024-10-21 PROCEDURE — 90662 IIV NO PRSV INCREASED AG IM: CPT | Performed by: NURSE PRACTITIONER

## 2024-10-21 PROCEDURE — 83036 HEMOGLOBIN GLYCOSYLATED A1C: CPT | Mod: MUE | Performed by: NURSE PRACTITIONER

## 2024-10-21 PROCEDURE — 1123F ACP DISCUSS/DSCN MKR DOCD: CPT | Performed by: NURSE PRACTITIONER

## 2024-10-21 RX ORDER — DICLOFENAC SODIUM 1 MG/ML
1 SOLUTION/ DROPS OPHTHALMIC 4 TIMES DAILY
COMMUNITY
Start: 2024-10-17

## 2024-10-21 RX ORDER — ROSUVASTATIN CALCIUM 40 MG/1
40 TABLET, COATED ORAL DAILY
Qty: 90 TABLET | Refills: 2 | Status: SHIPPED | OUTPATIENT
Start: 2024-10-21

## 2024-10-21 RX ORDER — MEMANTINE HYDROCHLORIDE 10 MG/1
10 TABLET ORAL NIGHTLY
COMMUNITY
Start: 2024-09-06

## 2024-10-21 RX ORDER — LISINOPRIL 5 MG/1
5 TABLET ORAL DAILY
Qty: 90 TABLET | Refills: 2 | Status: SHIPPED | OUTPATIENT
Start: 2024-10-21

## 2024-10-21 RX ORDER — BACITRACIN 500 [USP'U]/G
OINTMENT OPHTHALMIC 3 TIMES DAILY
COMMUNITY
Start: 2024-10-17

## 2024-10-21 ASSESSMENT — ENCOUNTER SYMPTOMS
SLEEP DISTURBANCE: 1
FATIGUE: 1

## 2024-10-21 ASSESSMENT — PATIENT HEALTH QUESTIONNAIRE - PHQ9
1. LITTLE INTEREST OR PLEASURE IN DOING THINGS: NOT AT ALL
2. FEELING DOWN, DEPRESSED OR HOPELESS: NOT AT ALL
SUM OF ALL RESPONSES TO PHQ9 QUESTIONS 1 AND 2: 0

## 2024-10-21 ASSESSMENT — PAIN SCALES - GENERAL: PAINLEVEL_OUTOF10: 0-NO PAIN

## 2024-10-21 ASSESSMENT — LIFESTYLE VARIABLES
SKIP TO QUESTIONS 9-10: 1
HOW OFTEN DO YOU HAVE A DRINK CONTAINING ALCOHOL: NEVER
AUDIT-C TOTAL SCORE: 0
HOW OFTEN DO YOU HAVE SIX OR MORE DRINKS ON ONE OCCASION: NEVER
HOW MANY STANDARD DRINKS CONTAINING ALCOHOL DO YOU HAVE ON A TYPICAL DAY: PATIENT DOES NOT DRINK

## 2024-10-21 NOTE — PROGRESS NOTES
"Subjective   Patient ID: Veronica Rice is a 73 y.o. female who presents for Diabetes (PT IS HERE FOR DM CHECK. /PT PREFERS TO GET FLU SHOT IN NOVEMBER. ).    Pt here with her  for a dm check, has seen dr zhang has sleep apnea and has masood waiting since July for an appointment, feels fatigued during the day,    Diabetes  She presents for her follow-up diabetic visit. She has type 2 diabetes mellitus. Her disease course has been stable. Associated symptoms include fatigue. There are no hypoglycemic complications. Diabetic complications include heart disease. Risk factors for coronary artery disease include diabetes mellitus, post-menopausal and tobacco exposure. Current diabetic treatment includes oral agent (monotherapy). She is compliant with treatment most of the time. She participates in exercise intermittently. An ACE inhibitor/angiotensin II receptor blocker is being taken. She does not see a podiatrist.Eye exam is current.        Review of Systems   Constitutional:  Positive for fatigue.   Psychiatric/Behavioral:  Positive for sleep disturbance.        Objective   /72   Pulse 92   Temp 36.2 °C (97.2 °F)   Resp 18   Ht 1.575 m (5' 2\")   Wt 71 kg (156 lb 9.6 oz)   LMP  (LMP Unknown)   SpO2 97%   BMI 28.64 kg/m²     Physical Exam  Constitutional:       General: She is not in acute distress.     Appearance: Normal appearance.   Cardiovascular:      Rate and Rhythm: Normal rate and regular rhythm.      Heart sounds: No murmur heard.  Pulmonary:      Breath sounds: Normal breath sounds. No wheezing.   Feet:      Right foot:      Skin integrity: Skin integrity normal.      Toenail Condition: Right toenails are normal.      Left foot:      Skin integrity: Skin integrity normal.      Toenail Condition: Left toenails are normal.   Neurological:      Mental Status: She is alert.       Assessment/Plan   Problem List Items Addressed This Visit             ICD-10-CM    Hyperlipidemia E78.5    HTN " (hypertension) I10    Diabetes mellitus (Multi) - Primary E11.9    Relevant Orders    POCT glycosylated hemoglobin (Hb A1C) manually resulted (Completed)     Other Visit Diagnoses         Codes    Sleep apnea, unspecified type     G47.30    Encounter for immunization     Z23    Other fatigue     R53.83    Tobacco dependence     F17.200          Discussed referral to see if can get in sooner with sleep specialist, dm stable . Med adjustments made. Return when back from juan call if needs anything, doing better . stable.

## 2024-12-19 DIAGNOSIS — J40 BRONCHITIS: Primary | ICD-10-CM

## 2024-12-19 RX ORDER — BENZONATATE 200 MG/1
200 CAPSULE ORAL 3 TIMES DAILY PRN
Qty: 42 CAPSULE | Refills: 3 | Status: SHIPPED | OUTPATIENT
Start: 2024-12-19 | End: 2025-06-17

## 2024-12-19 RX ORDER — FLUTICASONE PROPIONATE 50 MCG
1 SPRAY, SUSPENSION (ML) NASAL DAILY
Qty: 16 G | Refills: 5 | Status: SHIPPED | OUTPATIENT
Start: 2024-12-19 | End: 2025-12-19

## 2024-12-19 RX ORDER — DOXYCYCLINE 100 MG/1
100 CAPSULE ORAL 2 TIMES DAILY
Qty: 20 CAPSULE | Refills: 0 | Status: SHIPPED | OUTPATIENT
Start: 2024-12-19 | End: 2024-12-29

## 2024-12-23 ENCOUNTER — APPOINTMENT (OUTPATIENT)
Dept: SLEEP MEDICINE | Facility: CLINIC | Age: 74
End: 2024-12-23
Payer: MEDICARE

## 2025-03-10 DIAGNOSIS — F32.A DEPRESSION, UNSPECIFIED DEPRESSION TYPE: ICD-10-CM

## 2025-03-11 RX ORDER — ESCITALOPRAM OXALATE 20 MG/1
20 TABLET ORAL DAILY
Qty: 90 TABLET | Refills: 3 | Status: SHIPPED | OUTPATIENT
Start: 2025-03-11

## 2025-05-05 ENCOUNTER — APPOINTMENT (OUTPATIENT)
Dept: SLEEP MEDICINE | Facility: CLINIC | Age: 75
End: 2025-05-05
Payer: MEDICARE

## 2025-05-05 VITALS
SYSTOLIC BLOOD PRESSURE: 117 MMHG | BODY MASS INDEX: 28.35 KG/M2 | HEIGHT: 63 IN | OXYGEN SATURATION: 96 % | TEMPERATURE: 98.1 F | DIASTOLIC BLOOD PRESSURE: 54 MMHG | HEART RATE: 87 BPM | WEIGHT: 160 LBS

## 2025-05-05 DIAGNOSIS — G47.19 EXCESSIVE DAYTIME SLEEPINESS: ICD-10-CM

## 2025-05-05 DIAGNOSIS — G47.30 SLEEP APNEA, UNSPECIFIED TYPE: ICD-10-CM

## 2025-05-05 DIAGNOSIS — I10 HYPERTENSION, UNSPECIFIED TYPE: ICD-10-CM

## 2025-05-05 DIAGNOSIS — G47.33 OSA (OBSTRUCTIVE SLEEP APNEA): Primary | ICD-10-CM

## 2025-05-05 PROCEDURE — 1123F ACP DISCUSS/DSCN MKR DOCD: CPT | Performed by: STUDENT IN AN ORGANIZED HEALTH CARE EDUCATION/TRAINING PROGRAM

## 2025-05-05 PROCEDURE — 3078F DIAST BP <80 MM HG: CPT | Performed by: STUDENT IN AN ORGANIZED HEALTH CARE EDUCATION/TRAINING PROGRAM

## 2025-05-05 PROCEDURE — 1125F AMNT PAIN NOTED PAIN PRSNT: CPT | Performed by: STUDENT IN AN ORGANIZED HEALTH CARE EDUCATION/TRAINING PROGRAM

## 2025-05-05 PROCEDURE — G2211 COMPLEX E/M VISIT ADD ON: HCPCS | Performed by: STUDENT IN AN ORGANIZED HEALTH CARE EDUCATION/TRAINING PROGRAM

## 2025-05-05 PROCEDURE — 99204 OFFICE O/P NEW MOD 45 MIN: CPT | Performed by: STUDENT IN AN ORGANIZED HEALTH CARE EDUCATION/TRAINING PROGRAM

## 2025-05-05 PROCEDURE — 1159F MED LIST DOCD IN RCRD: CPT | Performed by: STUDENT IN AN ORGANIZED HEALTH CARE EDUCATION/TRAINING PROGRAM

## 2025-05-05 PROCEDURE — 3074F SYST BP LT 130 MM HG: CPT | Performed by: STUDENT IN AN ORGANIZED HEALTH CARE EDUCATION/TRAINING PROGRAM

## 2025-05-05 PROCEDURE — 1160F RVW MEDS BY RX/DR IN RCRD: CPT | Performed by: STUDENT IN AN ORGANIZED HEALTH CARE EDUCATION/TRAINING PROGRAM

## 2025-05-05 ASSESSMENT — SLEEP AND FATIGUE QUESTIONNAIRES
SATISFACTION_WITH_CURRENT_SLEEP_PATTERN: DISSATISFIED
HOW LIKELY ARE YOU TO NOD OFF OR FALL ASLEEP WHILE WATCHING TV: MODERATE CHANCE OF DOZING
WAKING_TOO_EARLY: VERY SEVERE
HOW LIKELY ARE YOU TO NOD OFF OR FALL ASLEEP WHILE LYING DOWN TO REST IN THE AFTERNOON WHEN CIRCUMSTANCES PERMIT: HIGH CHANCE OF DOZING
WORRIED_DISTRESSED_DUE_TO_SLEEP: MUCH
SLEEP_PROBLEM_NOTICEABLE_TO_OTHERS: MUCH
HOW LIKELY ARE YOU TO NOD OFF OR FALL ASLEEP WHILE SITTING QUIETLY AFTER LUNCH WITHOUT ALCOHOL: WOULD NEVER DOZE
HOW LIKELY ARE YOU TO NOD OFF OR FALL ASLEEP WHILE SITTING AND TALKING TO SOMEONE: WOULD NEVER DOZE
HOW LIKELY ARE YOU TO NOD OFF OR FALL ASLEEP WHILE SITTING AND READING: WOULD NEVER DOZE
DIFFICULTY_FALLING_ASLEEP: MODERATE
HOW LIKELY ARE YOU TO NOD OFF OR FALL ASLEEP WHEN YOU ARE A PASSENGER IN A CAR FOR AN HOUR WITHOUT A BREAK: WOULD NEVER DOZE
SLEEP_PROBLEM_INTERFERES_DAILY_ACTIVITIES: MUCH
DIFFICULTY_STAYING_ASLEEP: MODERATE
ESS-CHAD TOTAL SCORE: 5
SITING INACTIVE IN A PUBLIC PLACE LIKE A CLASS ROOM OR A MOVIE THEATER: WOULD NEVER DOZE
HOW LIKELY ARE YOU TO NOD OFF OR FALL ASLEEP IN A CAR, WHILE STOPPED FOR A FEW MINUTES IN TRAFFIC: WOULD NEVER DOZE

## 2025-05-05 ASSESSMENT — PATIENT HEALTH QUESTIONNAIRE - PHQ9
2. FEELING DOWN, DEPRESSED OR HOPELESS: NOT AT ALL
SUM OF ALL RESPONSES TO PHQ9 QUESTIONS 1 AND 2: 0
1. LITTLE INTEREST OR PLEASURE IN DOING THINGS: NOT AT ALL

## 2025-05-05 ASSESSMENT — PAIN SCALES - GENERAL: PAINLEVEL_OUTOF10: 3

## 2025-05-05 NOTE — ASSESSMENT & PLAN NOTE
BP Readings from Last 1 Encounters:   05/05/25 117/54     - doing well, asymptomatic  - discussed at length the impact of untreated LANDRY and BP control  - continue current management and follow-up with PCP

## 2025-05-05 NOTE — PROGRESS NOTES
Patient: Veronica Rice    [id number]  : 1950 -- AGE 74 y.o.    Provider: Eid Horvath MD     Location [facility]   Service Date: 2025              [facility]  New Visit Note     Assessment/Plan   Ms. Rice is a 74 y.o. female and she returns in followup to the [facility] for the problems listed below on 25   Problem List, Orders, Assessment, Recommendations:  Problem List Items Addressed This Visit           ICD-10-CM    HTN (hypertension) I10    BP Readings from Last 1 Encounters:   25 117/54     - doing well, asymptomatic  - discussed at length the impact of untreated LANDRY and BP control  - continue current management and follow-up with PCP           LANDRY (obstructive sleep apnea) - Primary G47.33    - PSG showed Severe LANDRY with with AHI3% - 30 events/hr  - will start APAP 6-12 cwp via [facility]  - lengthy discussion on LANDRY and PAP therapy education as well as the tips to be successful with PAP treatment  - patient voiced understanding  - patient will follow-up in 1-3 months and bring equipment to the follow-up clinic           Relevant Orders    Positive Airway Pressure (PAP) Therapy    Excessive daytime sleepiness G47.19    Likely 2/2 untreated LANDRY  Will continue to monitor          Other Visit Diagnoses         Codes      Sleep apnea, unspecified type     G47.30          Disposition  Return to clinic in 4 months          HISTORY OF PRESENT ILLNESS     The patient's referring provider is: Smita Russ APRN*; [facility]    HISTORY OF PRESENT ILLNESS   Veronica Rice is a 74 y.o. female with history of LANDRY presents to [facility] for a sleep medicine evaluation with concerns of Establish Care.     Past Sleep History  Patient has the following sleep-related diagnoses: obstructive sleep apnea. Prior Study Details(Optional): Prior study result was significant for obstructive  sleep apnea with AHI4% - 15 events/hr and with AHI3% - 30 events/hr via Diagnostic Polysomnogram done in 7/2024    Current History    On today's visit, the patient reports that she was referred for a sleep study by her neurologist who she originally saw for memory issues.  Her main concern is daytime sleepiness and she has been experiencing such symptoms for years.  She also admits to classic OSAS symptoms such as snoring. She is open to therapies.    Sleep schedule:      Weekdays / Work Days Weekends / Days Off   Bedtime 9 pm  same as weekdays   Sleep latency 30 min same as weekdays   Wake time 4-5 am  same as weekdays   Perceived Total sleep time  Average/day:  10-12 hours/day Same as weekdays   Naps Napping habit(Optional): Yes, patient naps daily for 2 - 3 hours. Patient reports naps are: refreshing     Preferred sleeping position: supine and sidelying    Sleep-related ROS:    Sleep Initiation: Patient: has no problems going to sleep  Sleep Maintenance: Sleep Maintenance: wakes up about 2-3 times per night and easily returns to sleep after awakening  Problems staying asleep associated with: nocturia    Breathing during sleep: snoring, snorting during sleep, and witnessed apneas    RLS screen:  - Patient does not have unusual sensations in their extremities that cause an urge to move them.     Daytime Symptoms  On awakening patient reports: morning dry mouth, morning headaches, and morning sore throat    Patient reports: no daytime symptoms  Patient denies: excessive daytime sleepiness  Fatigue concerns: Patient reports fatigue symptoms are bothersome, but is easily able to carry out all usual work/school/family activities    ESS: 5  ALBERT: 20  FOSQ: 27    Sleep Disorder Specific Review of Social History  Veronica does not have a family history of sleep disorder.   She  reports that she has been smoking cigarettes. She started smoking about 57 years ago. She has a 28.5 pack-year smoking history. She has never used  "smokeless tobacco. She reports that she does not drink alcohol and does not use drugs. Veronica currently lives with her     Caffeine consumption: Yes, Patient consumes caffeine beverage regularly, about 1 cup(s)/day.  Alcohol consumption: Yes, Patient consumes alcohol regularly, about 1 drink(s)/week.  Smoking: Yes  Marijuana: No    ALLERGIES AND MEDICATIONS   ALLERGIES  Allergies[1]    MEDICATIONS  Current Medications[2]      PAST HISTORY     PAST MEDICAL HISTORY  She  has a past medical history of Anxiety (6.18), Arthritis (8.12), Cataract (10.20), DM (diabetes mellitus) (Multi), and HLD (hyperlipidemia).    PAST SURGICAL HISTORY:  Surgical History[3]    FAMILY HISTORY  Family History[4]          PHYSICAL EXAM     VITAL SIGNS: /54 (BP Location: Left arm, Patient Position: Sitting, BP Cuff Size: Large adult)   Pulse 87   Temp 36.7 °C (98.1 °F) (Oral)   Ht 1.6 m (5' 3\")   Wt 72.6 kg (160 lb)   LMP  (LMP Unknown)   SpO2 96%   BMI 28.34 kg/m²      CURRENT WEIGHT:   Vitals:    05/05/25 1111   Weight: 72.6 kg (160 lb)     Body mass index is 28.34 kg/m².  PREVIOUS WEIGHTS:  Wt Readings from Last 3 Encounters:   05/05/25 72.6 kg (160 lb)   10/21/24 71 kg (156 lb 9.6 oz)   07/24/24 71.2 kg (157 lb)       PHYSICAL EXAM: MODIFIED MALLAMPATI SCORE: III (soft and hard palate and base of uvula visible)  TONGUE SCALLOPING: with scalloping      RESULTS/DATA     Bicarbonate (mmol/L)   Date Value   10/21/2024 28   05/06/2024 28   04/19/2024 26     Iron (UG/DL)   Date Value   03/02/2022 131     Iron Saturation (%)   Date Value   03/02/2022 40.8     TIBC (UG/DL)   Date Value   03/02/2022 321          [1]   Allergies  Allergen Reactions    Meperidine GI Upset, Rash and Hives   [2]   Current Outpatient Medications   Medication Sig Dispense Refill    acetaminophen (TylenoL) 325 mg tablet Take by mouth.      alendronate (Fosamax) 70 mg tablet Take 1 tablet (70 mg) by mouth 1 (one) time per week. 3 tablet 3    aspirin " 81 mg EC tablet Take 1 tablet (81 mg) by mouth once daily.      bacitracin ophthalmic ointment Apply to right eye 3 times a day.      benzonatate (Tessalon) 200 mg capsule Take 1 capsule (200 mg) by mouth 3 times a day as needed for cough. Do not crush or chew. 42 capsule 3    blood sugar diagnostic (OneTouch Ultra Test) strip Use to test glucose daily as directed 100 each 1    blood-glucose meter (OneTouch Ultra2 Meter) misc Use to test glucose as directed 1 each 0    diclofenac (Voltaren) 0.1 % ophthalmic solution Administer 1 drop into the right eye 4 times a day.      dicyclomine (Bentyl) 10 mg capsule 1 cap(s) orally EVERY BEFORE MEALS AS NEEDED 30 day(s)      ergocalciferol (Vitamin D-2) 1.25 MG (66126 UT) capsule Take 1 capsule (50,000 Units) by mouth 1 (one) time per week. 12 capsule 3    escitalopram (Lexapro) 20 mg tablet TAKE 1 TABLET DAILY 90 tablet 3    lancets (OneTouch UltraSoft 2 Lancet) 30 gauge misc Use to test glucose daily as directed 100 each 1    lisinopril 5 mg tablet Take 1 tablet (5 mg) by mouth once daily. 90 tablet 2    memantine (Namenda) 10 mg tablet Take 1 tablet (10 mg) by mouth once daily at bedtime.      rosuvastatin (Crestor) 40 mg tablet Take 1 tablet (40 mg) by mouth once daily. 90 tablet 2    SITagliptin phosphate (Januvia) 100 mg tablet Take 1 tablet (100 mg) by mouth once daily. 90 tablet 2    fluticasone (Flonase) 50 mcg/actuation nasal spray Administer 1 spray into each nostril once daily. Shake gently. Before first use, prime pump. After use, clean tip and replace cap. 16 g 5    loteprednol (Lotemax) 0.5 % ophthalmic suspension 4 times a day.      memantine (Namenda) 5 mg tablet Take 1 tablet (5 mg) by mouth once daily in the morning. (Patient not taking: Reported on 5/5/2025)       No current facility-administered medications for this visit.   [3]   Past Surgical History:  Procedure Laterality Date    EYE SURGERY      JOINT REPLACEMENT      KNEE SURGERY      TONSILLECTOMY      [4]   Family History  Problem Relation Name Age of Onset    COPD Mother Gladis Perla     Heart failure Mother Gladis Perla     Heart disease Mother Galdis Perla     Arthritis Mother Gladis Perla     Cancer Mother Gladis Perla     Arthritis Father Josiah Perla

## 2025-05-05 NOTE — ASSESSMENT & PLAN NOTE
- PSG showed Severe LANDRY with with AHI3% - 30 events/hr  - will start APAP 6-12 cwp via [facility]  - lengthy discussion on LANDRY and PAP therapy education as well as the tips to be successful with PAP treatment  - patient voiced understanding  - patient will follow-up in 1-3 months and bring equipment to the follow-up clinic

## 2025-05-14 ENCOUNTER — APPOINTMENT (OUTPATIENT)
Dept: OTOLARYNGOLOGY | Facility: CLINIC | Age: 75
End: 2025-05-14
Payer: MEDICARE

## 2025-05-18 ASSESSMENT — PROMIS GLOBAL HEALTH SCALE
CARRYOUT_SOCIAL_ACTIVITIES: GOOD
RATE_GENERAL_HEALTH: GOOD
RATE_MENTAL_HEALTH: GOOD
RATE_AVERAGE_PAIN: 4
RATE_QUALITY_OF_LIFE: VERY GOOD
EMOTIONAL_PROBLEMS: SOMETIMES
RATE_SOCIAL_SATISFACTION: GOOD
RATE_PHYSICAL_HEALTH: GOOD
RATE_AVERAGE_FATIGUE: MODERATE
CARRYOUT_PHYSICAL_ACTIVITIES: MOSTLY

## 2025-05-20 ENCOUNTER — OFFICE VISIT (OUTPATIENT)
Dept: PRIMARY CARE | Facility: CLINIC | Age: 75
End: 2025-05-20
Payer: MEDICARE

## 2025-05-20 ENCOUNTER — HOSPITAL ENCOUNTER (OUTPATIENT)
Dept: RADIOLOGY | Facility: HOSPITAL | Age: 75
Discharge: HOME | End: 2025-05-20
Payer: MEDICARE

## 2025-05-20 VITALS
HEIGHT: 63 IN | HEART RATE: 82 BPM | RESPIRATION RATE: 18 BRPM | WEIGHT: 152.6 LBS | BODY MASS INDEX: 27.04 KG/M2 | DIASTOLIC BLOOD PRESSURE: 68 MMHG | TEMPERATURE: 97.1 F | SYSTOLIC BLOOD PRESSURE: 112 MMHG | OXYGEN SATURATION: 97 %

## 2025-05-20 DIAGNOSIS — Z12.83 SCREENING FOR SKIN CANCER: ICD-10-CM

## 2025-05-20 DIAGNOSIS — Z12.31 ENCOUNTER FOR SCREENING MAMMOGRAM FOR MALIGNANT NEOPLASM OF BREAST: ICD-10-CM

## 2025-05-20 DIAGNOSIS — F41.9 ANXIETY: ICD-10-CM

## 2025-05-20 DIAGNOSIS — I10 HYPERTENSION, UNSPECIFIED TYPE: ICD-10-CM

## 2025-05-20 DIAGNOSIS — G47.33 OSA (OBSTRUCTIVE SLEEP APNEA): Primary | ICD-10-CM

## 2025-05-20 DIAGNOSIS — E11.65 TYPE 2 DIABETES MELLITUS WITH HYPERGLYCEMIA, WITHOUT LONG-TERM CURRENT USE OF INSULIN: ICD-10-CM

## 2025-05-20 DIAGNOSIS — E78.2 MIXED HYPERLIPIDEMIA: ICD-10-CM

## 2025-05-20 LAB — POC HEMOGLOBIN A1C: 6.5 % (ref 4.2–6.5)

## 2025-05-20 PROCEDURE — 3074F SYST BP LT 130 MM HG: CPT | Performed by: NURSE PRACTITIONER

## 2025-05-20 PROCEDURE — 1159F MED LIST DOCD IN RCRD: CPT | Performed by: NURSE PRACTITIONER

## 2025-05-20 PROCEDURE — 99214 OFFICE O/P EST MOD 30 MIN: CPT | Performed by: NURSE PRACTITIONER

## 2025-05-20 PROCEDURE — 1125F AMNT PAIN NOTED PAIN PRSNT: CPT | Performed by: NURSE PRACTITIONER

## 2025-05-20 PROCEDURE — 3078F DIAST BP <80 MM HG: CPT | Performed by: NURSE PRACTITIONER

## 2025-05-20 PROCEDURE — 77063 BREAST TOMOSYNTHESIS BI: CPT | Performed by: RADIOLOGY

## 2025-05-20 PROCEDURE — 77067 SCR MAMMO BI INCL CAD: CPT | Performed by: RADIOLOGY

## 2025-05-20 PROCEDURE — 83036 HEMOGLOBIN GLYCOSYLATED A1C: CPT | Performed by: NURSE PRACTITIONER

## 2025-05-20 PROCEDURE — 77067 SCR MAMMO BI INCL CAD: CPT

## 2025-05-20 ASSESSMENT — ENCOUNTER SYMPTOMS
SLEEP DISTURBANCE: 1
FATIGUE: 1

## 2025-05-20 ASSESSMENT — PAIN SCALES - GENERAL: PAINLEVEL_OUTOF10: 3

## 2025-05-20 NOTE — PROGRESS NOTES
"Subjective   Patient ID: Veronica Rice is a 74 y.o. female who presents for Diabetes (PT IS HERE FOR DM CHECK ) and Follow-up (PT WOULD LIKE TO FOLLOW UP FROM RECENT APPOINTMENTS WITH OTHER PROVIDERS. ).    HERE FOR DM VISIT WAS IN [place], JUST GOT BACK DOING WELL HAD SLEEP APNEA Test,feels well . Dm has impoved . Here with her ,    Diabetes  She presents for her follow-up diabetic visit. She has type 2 diabetes mellitus. Her disease course has been improving. Associated symptoms include fatigue. There are no hypoglycemic complications. Symptoms are improving. Diabetic complications include heart disease. Risk factors for coronary artery disease include post-menopausal, hypertension, diabetes mellitus and family history. She is compliant with treatment most of the time. She has had a previous visit with a dietitian. She participates in exercise intermittently. An ACE inhibitor/angiotensin II receptor blocker is being taken. She does not see a podiatrist.Eye exam is current.        Review of Systems   Constitutional:  Positive for fatigue.   Psychiatric/Behavioral:  Positive for sleep disturbance.        Objective   /68   Pulse 82   Temp 36.2 °C (97.1 °F)   Resp 18   Ht 1.6 m (5' 3\")   Wt 69.2 kg (152 lb 9.6 oz)   LMP  (LMP Unknown)   SpO2 97%   BMI 27.03 kg/m²     Physical Exam  Constitutional:       General: She is not in acute distress.     Appearance: Normal appearance.   Cardiovascular:      Rate and Rhythm: Normal rate and regular rhythm.      Heart sounds: No murmur heard.  Pulmonary:      Breath sounds: Normal breath sounds. No wheezing.   Skin:     Comments: Moles sun damage   Neurological:      Mental Status: She is alert and oriented to person, place, and time.   Psychiatric:         Behavior: Behavior normal.         Assessment/Plan   Problem List Items Addressed This Visit           ICD-10-CM    Hyperlipidemia E78.5    Anxiety F41.9    HTN (hypertension) I10    Diabetes mellitus " (Multi) E11.9    Relevant Orders    POCT glycosylated hemoglobin (Hb A1C) manually resulted (Completed)    LANDRY (obstructive sleep apnea) - Primary G47.33     Other Visit Diagnoses         Codes      Encounter for screening mammogram for malignant neoplasm of breast     Z12.31    Relevant Orders    BI mammo bilateral screening tomosynthesis      Screening for skin cancer     Z12.83    Relevant Orders    Referral to Dermatology        Jacobo do bloodwork  screening sin July when return for cpe, dm and blood pressure controlled. Doing well

## 2025-05-31 DIAGNOSIS — E11.65 TYPE 2 DIABETES MELLITUS WITH HYPERGLYCEMIA, WITHOUT LONG-TERM CURRENT USE OF INSULIN: ICD-10-CM

## 2025-05-31 DIAGNOSIS — E11.9 TYPE 2 DIABETES MELLITUS WITHOUT COMPLICATION, WITHOUT LONG-TERM CURRENT USE OF INSULIN: ICD-10-CM

## 2025-06-02 RX ORDER — LANCETS 30 GAUGE
EACH MISCELLANEOUS
Qty: 100 EACH | Refills: 3 | Status: SHIPPED | OUTPATIENT
Start: 2025-06-02

## 2025-06-09 DIAGNOSIS — F32.A DEPRESSION, UNSPECIFIED DEPRESSION TYPE: ICD-10-CM

## 2025-06-09 RX ORDER — ESCITALOPRAM OXALATE 20 MG/1
20 TABLET ORAL DAILY
Qty: 90 TABLET | Refills: 3 | Status: SHIPPED | OUTPATIENT
Start: 2025-06-09

## 2025-07-28 ENCOUNTER — OFFICE VISIT (OUTPATIENT)
Dept: PRIMARY CARE | Facility: CLINIC | Age: 75
End: 2025-07-28
Payer: MEDICARE

## 2025-07-28 VITALS
TEMPERATURE: 97.1 F | RESPIRATION RATE: 18 BRPM | WEIGHT: 153.6 LBS | DIASTOLIC BLOOD PRESSURE: 70 MMHG | BODY MASS INDEX: 27.21 KG/M2 | HEART RATE: 87 BPM | SYSTOLIC BLOOD PRESSURE: 122 MMHG | OXYGEN SATURATION: 96 % | HEIGHT: 63 IN

## 2025-07-28 DIAGNOSIS — Z23 ENCOUNTER FOR IMMUNIZATION: ICD-10-CM

## 2025-07-28 DIAGNOSIS — Z00.00 ROUTINE MEDICAL EXAM: ICD-10-CM

## 2025-07-28 DIAGNOSIS — Z11.59 ENCOUNTER FOR HEPATITIS C SCREENING TEST FOR LOW RISK PATIENT: ICD-10-CM

## 2025-07-28 DIAGNOSIS — E55.9 VITAMIN D DEFICIENCY: ICD-10-CM

## 2025-07-28 DIAGNOSIS — R53.83 OTHER FATIGUE: ICD-10-CM

## 2025-07-28 DIAGNOSIS — Z00.00 WELL ADULT EXAM: Primary | ICD-10-CM

## 2025-07-28 DIAGNOSIS — E11.65 TYPE 2 DIABETES MELLITUS WITH HYPERGLYCEMIA, WITHOUT LONG-TERM CURRENT USE OF INSULIN: ICD-10-CM

## 2025-07-28 DIAGNOSIS — F41.9 ANXIETY: ICD-10-CM

## 2025-07-28 DIAGNOSIS — G47.30 SLEEP APNEA, UNSPECIFIED TYPE: ICD-10-CM

## 2025-07-28 DIAGNOSIS — M85.80 OSTEOPENIA, UNSPECIFIED LOCATION: ICD-10-CM

## 2025-07-28 PROCEDURE — 1159F MED LIST DOCD IN RCRD: CPT | Performed by: NURSE PRACTITIONER

## 2025-07-28 PROCEDURE — 1170F FXNL STATUS ASSESSED: CPT | Performed by: NURSE PRACTITIONER

## 2025-07-28 PROCEDURE — 99215 OFFICE O/P EST HI 40 MIN: CPT | Performed by: NURSE PRACTITIONER

## 2025-07-28 PROCEDURE — 3074F SYST BP LT 130 MM HG: CPT | Performed by: NURSE PRACTITIONER

## 2025-07-28 PROCEDURE — G0439 PPPS, SUBSEQ VISIT: HCPCS | Performed by: NURSE PRACTITIONER

## 2025-07-28 PROCEDURE — 1126F AMNT PAIN NOTED NONE PRSNT: CPT | Performed by: NURSE PRACTITIONER

## 2025-07-28 PROCEDURE — 3078F DIAST BP <80 MM HG: CPT | Performed by: NURSE PRACTITIONER

## 2025-07-28 RX ORDER — ERGOCALCIFEROL 1.25 MG/1
1.25 CAPSULE ORAL WEEKLY
COMMUNITY
Start: 2025-07-09

## 2025-07-28 RX ORDER — ALENDRONATE SODIUM 70 MG/1
70 TABLET ORAL
Qty: 12 TABLET | Refills: 3 | Status: SHIPPED | OUTPATIENT
Start: 2025-07-28

## 2025-07-28 ASSESSMENT — PAIN SCALES - GENERAL: PAINLEVEL_OUTOF10: 0-NO PAIN

## 2025-07-28 ASSESSMENT — PATIENT HEALTH QUESTIONNAIRE - PHQ9
SUM OF ALL RESPONSES TO PHQ9 QUESTIONS 1 AND 2: 0
2. FEELING DOWN, DEPRESSED OR HOPELESS: NOT AT ALL
1. LITTLE INTEREST OR PLEASURE IN DOING THINGS: NOT AT ALL

## 2025-07-28 ASSESSMENT — ACTIVITIES OF DAILY LIVING (ADL)
MANAGING_FINANCES: INDEPENDENT
DOING_HOUSEWORK: INDEPENDENT
BATHING: INDEPENDENT
DRESSING: INDEPENDENT
GROCERY_SHOPPING: INDEPENDENT
TAKING_MEDICATION: INDEPENDENT

## 2025-07-28 NOTE — PROGRESS NOTES
"Subjective   Patient ID: Veronica Rice is a 74 y.o. female who presents for Medicare Annual Wellness Visit Subsequent (PT IS HERE FOR ANNUAL EXAM, ACCOMPANIED BY  ).    PT IS HERE FOR A WELLVISIT.. here with her , seeing opth, using cpap nightly, feel better, overall doing better.    Diabetes  She presents for her follow-up diabetic visit. She has type 2 diabetes mellitus. Her disease course has been improving. Risk factors for coronary artery disease include diabetes mellitus, dyslipidemia and stress. Current diabetic treatment includes oral agent (monotherapy). She is following a generally healthy diet. She has not had a previous visit with a dietitian. She participates in exercise daily. Eye exam is current.        Review of Systems   All other systems reviewed and are negative.      Objective   /70   Pulse 87   Temp 36.2 °C (97.1 °F)   Resp 18   Ht 1.6 m (5' 3\")   Wt 69.7 kg (153 lb 9.6 oz)   LMP  (LMP Unknown)   SpO2 96%   BMI 27.21 kg/m²     Physical Exam  Vitals and nursing note reviewed.   Constitutional:       General: She is not in acute distress.  HENT:      Right Ear: Tympanic membrane and ear canal normal.      Left Ear: Tympanic membrane and ear canal normal.      Nose: Nose normal. No rhinorrhea.      Mouth/Throat:      Pharynx: Oropharynx is clear. No oropharyngeal exudate or posterior oropharyngeal erythema.      Comments: Dentition wnl    Eyes:      Extraocular Movements: Extraocular movements intact.      Conjunctiva/sclera: Conjunctivae normal.      Pupils: Pupils are equal, round, and reactive to light.     Neck:      Vascular: No carotid bruit.     Cardiovascular:      Rate and Rhythm: Normal rate and regular rhythm.      Heart sounds: Normal heart sounds. No murmur heard.  Pulmonary:      Breath sounds: Normal breath sounds. No wheezing or rhonchi.   Abdominal:      General: Bowel sounds are normal. There is no distension.      Palpations: Abdomen is soft. There is " no mass.      Tenderness: There is no abdominal tenderness. There is no guarding or rebound.      Hernia: No hernia is present.   Genitourinary:     Comments: BREAST EXAM NL    Musculoskeletal:         General: No swelling or tenderness. Normal range of motion.      Cervical back: Normal range of motion and neck supple.   Lymphadenopathy:      Cervical: No cervical adenopathy.     Skin:     General: Skin is warm.      Findings: No rash.      Comments: LOTS OF MOLES     Neurological:      General: No focal deficit present.      Mental Status: She is alert.     Psychiatric:         Behavior: Behavior normal.         Assessment/Plan   Problem List Items Addressed This Visit           ICD-10-CM    Vitamin D deficiency E55.9    Relevant Orders    Vitamin D 25-Hydroxy,Total (for eval of Vitamin D levels)    Diabetes mellitus (Multi) E11.9     Other Visit Diagnoses         Codes      Well adult exam    -  Primary Z00.00      Routine medical exam     Z00.00    Relevant Orders    Comprehensive Metabolic Panel    Lipid Panel    TSH with reflex to Free T4 if abnormal      Encounter for immunization     Z23      Encounter for hepatitis C screening test for low risk patient     Z11.59    Relevant Orders    Hepatitis C Antibody      Other fatigue     R53.83    Relevant Orders    CBC and Auto Differential        Will call with results, return next month for dm exam. Doing well

## 2025-07-31 LAB
25(OH)D3+25(OH)D2 SERPL-MCNC: 87 NG/ML (ref 30–100)
ALBUMIN SERPL-MCNC: 4.4 G/DL (ref 3.6–5.1)
ALP SERPL-CCNC: 65 U/L (ref 37–153)
ALT SERPL-CCNC: 16 U/L (ref 6–29)
ANION GAP SERPL CALCULATED.4IONS-SCNC: 9 MMOL/L (CALC) (ref 7–17)
AST SERPL-CCNC: 20 U/L (ref 10–35)
BASOPHILS # BLD AUTO: 50 CELLS/UL (ref 0–200)
BASOPHILS NFR BLD AUTO: 0.7 %
BILIRUB SERPL-MCNC: 0.5 MG/DL (ref 0.2–1.2)
BUN SERPL-MCNC: 15 MG/DL (ref 7–25)
CALCIUM SERPL-MCNC: 9.2 MG/DL (ref 8.6–10.4)
CHLORIDE SERPL-SCNC: 102 MMOL/L (ref 98–110)
CHOLEST SERPL-MCNC: 122 MG/DL
CHOLEST/HDLC SERPL: 2.4 (CALC)
CO2 SERPL-SCNC: 26 MMOL/L (ref 20–32)
CREAT SERPL-MCNC: 0.72 MG/DL (ref 0.6–1)
EGFRCR SERPLBLD CKD-EPI 2021: 87 ML/MIN/1.73M2
EOSINOPHIL # BLD AUTO: 220 CELLS/UL (ref 15–500)
EOSINOPHIL NFR BLD AUTO: 3.1 %
ERYTHROCYTE [DISTWIDTH] IN BLOOD BY AUTOMATED COUNT: 13.9 % (ref 11–15)
GLUCOSE SERPL-MCNC: 138 MG/DL (ref 65–99)
HCT VFR BLD AUTO: 44.5 % (ref 35–45)
HCV AB SERPL QL IA: NORMAL
HDLC SERPL-MCNC: 51 MG/DL
HGB BLD-MCNC: 13.8 G/DL (ref 11.7–15.5)
LDLC SERPL CALC-MCNC: 53 MG/DL (CALC)
LYMPHOCYTES # BLD AUTO: 2180 CELLS/UL (ref 850–3900)
LYMPHOCYTES NFR BLD AUTO: 30.7 %
MCH RBC QN AUTO: 30.5 PG (ref 27–33)
MCHC RBC AUTO-ENTMCNC: 31 G/DL (ref 32–36)
MCV RBC AUTO: 98.2 FL (ref 80–100)
MONOCYTES # BLD AUTO: 391 CELLS/UL (ref 200–950)
MONOCYTES NFR BLD AUTO: 5.5 %
NEUTROPHILS # BLD AUTO: 4260 CELLS/UL (ref 1500–7800)
NEUTROPHILS NFR BLD AUTO: 60 %
NONHDLC SERPL-MCNC: 71 MG/DL (CALC)
PLATELET # BLD AUTO: 302 THOUSAND/UL (ref 140–400)
PMV BLD REES-ECKER: 9.4 FL (ref 7.5–12.5)
POTASSIUM SERPL-SCNC: 4.5 MMOL/L (ref 3.5–5.3)
PROT SERPL-MCNC: 7.4 G/DL (ref 6.1–8.1)
RBC # BLD AUTO: 4.53 MILLION/UL (ref 3.8–5.1)
SODIUM SERPL-SCNC: 137 MMOL/L (ref 135–146)
TRIGL SERPL-MCNC: 96 MG/DL
TSH SERPL-ACNC: 1.85 MIU/L (ref 0.4–4.5)
WBC # BLD AUTO: 7.1 THOUSAND/UL (ref 3.8–10.8)

## 2025-08-07 DIAGNOSIS — E11.9 TYPE 2 DIABETES MELLITUS WITHOUT COMPLICATION, WITHOUT LONG-TERM CURRENT USE OF INSULIN: ICD-10-CM

## 2025-08-07 DIAGNOSIS — E11.65 TYPE 2 DIABETES MELLITUS WITH HYPERGLYCEMIA, WITHOUT LONG-TERM CURRENT USE OF INSULIN: ICD-10-CM

## 2025-08-07 RX ORDER — SITAGLIPTIN 100 MG/1
100 TABLET, FILM COATED ORAL DAILY
Qty: 90 TABLET | Refills: 3 | Status: SHIPPED | OUTPATIENT
Start: 2025-08-07

## 2025-08-07 RX ORDER — LISINOPRIL 5 MG/1
5 TABLET ORAL DAILY
Qty: 90 TABLET | Refills: 3 | Status: SHIPPED | OUTPATIENT
Start: 2025-08-07

## 2025-09-05 ENCOUNTER — APPOINTMENT (OUTPATIENT)
Dept: SLEEP MEDICINE | Facility: CLINIC | Age: 75
End: 2025-09-05
Payer: MEDICARE

## 2025-09-05 ASSESSMENT — SLEEP AND FATIGUE QUESTIONNAIRES
SITING INACTIVE IN A PUBLIC PLACE LIKE A CLASS ROOM OR A MOVIE THEATER: WOULD NEVER DOZE
SLEEP_PROBLEM_INTERFERES_DAILY_ACTIVITIES: SOMEWHAT
HOW LIKELY ARE YOU TO NOD OFF OR FALL ASLEEP WHEN YOU ARE A PASSENGER IN A CAR FOR AN HOUR WITHOUT A BREAK: WOULD NEVER DOZE
WAKING_TOO_EARLY: MODERATE
HOW LIKELY ARE YOU TO NOD OFF OR FALL ASLEEP IN A CAR, WHILE STOPPED FOR A FEW MINUTES IN TRAFFIC: WOULD NEVER DOZE
SLEEP_PROBLEM_NOTICEABLE_TO_OTHERS: A LITTLE
WORRIED_DISTRESSED_DUE_TO_SLEEP: A LITTLE
HOW LIKELY ARE YOU TO NOD OFF OR FALL ASLEEP WHILE SITTING AND TALKING TO SOMEONE: WOULD NEVER DOZE
HOW LIKELY ARE YOU TO NOD OFF OR FALL ASLEEP WHILE LYING DOWN TO REST IN THE AFTERNOON WHEN CIRCUMSTANCES PERMIT: HIGH CHANCE OF DOZING
ESS-CHAD TOTAL SCORE: 5
HOW LIKELY ARE YOU TO NOD OFF OR FALL ASLEEP WHILE SITTING AND READING: SLIGHT CHANCE OF DOZING
SATISFACTION_WITH_CURRENT_SLEEP_PATTERN: SATISFIED
HOW LIKELY ARE YOU TO NOD OFF OR FALL ASLEEP WHILE WATCHING TV: SLIGHT CHANCE OF DOZING
HOW LIKELY ARE YOU TO NOD OFF OR FALL ASLEEP WHILE SITTING QUIETLY AFTER LUNCH WITHOUT ALCOHOL: WOULD NEVER DOZE

## 2026-09-11 ENCOUNTER — APPOINTMENT (OUTPATIENT)
Dept: SLEEP MEDICINE | Facility: CLINIC | Age: 76
End: 2026-09-11
Payer: MEDICARE

## (undated) DEVICE — Device

## (undated) DEVICE — ADHESIVE, SKIN, LIQUIBAND EXCEED

## (undated) DEVICE — GLOVE, SURGICAL, PROTEXIS PI , 6.5, PF, LF

## (undated) DEVICE — SUTURE, SILK, 3-0, 18 IN, MULTIPACK, BLACK

## (undated) DEVICE — DRAPE, SHEET, THYROID, W/ARMBOARD COVER, 100 X 121 IN, DISPOSABLE, LF, STERILE

## (undated) DEVICE — TIP, SUCTION, YANKAUER, BULB, ADULT

## (undated) DEVICE — SUTURE, MONOCRYL, 4-0, 18 IN, PS2, UNDYED

## (undated) DEVICE — SUTURE, SILK, 4-0, 18 IN, LABYRINTH, BLACK

## (undated) DEVICE — DRESSING, NON-ADHERENT, TELFA, OUCHLESS, 3 X 8 IN, STERILE

## (undated) DEVICE — TUBING, SUCTION, CONNECTING, STERILE 0.25 X 120 IN., LF

## (undated) DEVICE — DRAPE, INCISE, ANTIMICROBIAL, IOBAN 2, LARGE, 17 X 23 IN, DISPOSABLE, STERILE

## (undated) DEVICE — SUTURE, VICRYL, 4-0, 27IN, RB-1

## (undated) DEVICE — CLEANER, ELECTROSURGICAL, TIP, 5 X 5 CM, LF

## (undated) DEVICE — ELECTRODE, ELECTROSURGICAL, NEEDLE, 1.1 IN, LF

## (undated) DEVICE — MANIFOLD, 4 PORT NEPTUNE STANDARD

## (undated) DEVICE — COVER, CART, 45 X 27 X 48 IN, CLEAR